# Patient Record
Sex: FEMALE | Race: WHITE | NOT HISPANIC OR LATINO | Employment: PART TIME | URBAN - METROPOLITAN AREA
[De-identification: names, ages, dates, MRNs, and addresses within clinical notes are randomized per-mention and may not be internally consistent; named-entity substitution may affect disease eponyms.]

---

## 2021-09-27 PROBLEM — M17.11 OSTEOARTHROSIS, LOCALIZED, PRIMARY, KNEE, RIGHT: Status: ACTIVE | Noted: 2021-09-27

## 2021-12-07 PROBLEM — I10 HYPERTENSION: Status: ACTIVE | Noted: 2021-12-07

## 2021-12-07 PROBLEM — R06.02 SHORTNESS OF BREATH: Status: ACTIVE | Noted: 2021-12-07

## 2021-12-07 PROBLEM — J30.2 SEASONAL ALLERGIC RHINITIS: Status: ACTIVE | Noted: 2021-12-07

## 2021-12-07 PROBLEM — Z51.89 ENCOUNTER FOR OTHER SPECIFIED AFTERCARE: Status: ACTIVE | Noted: 2018-03-21

## 2021-12-07 PROBLEM — E78.5 HYPERLIPEMIA: Status: ACTIVE | Noted: 2021-12-07

## 2021-12-28 PROBLEM — M17.11 PRIMARY OSTEOARTHRITIS OF RIGHT KNEE: Status: ACTIVE | Noted: 2021-10-25

## 2022-03-02 ENCOUNTER — TELEPHONE (OUTPATIENT)
Dept: ORTHOPEDIC SURGERY | Facility: CLINIC | Age: 52
End: 2022-03-02

## 2022-03-02 NOTE — TELEPHONE ENCOUNTER
Provider: DR. BANUELOS    Caller: PATIENT    Relationship to Patient: SELF    Phone Number: [phone]    Reason for Call: PT. HAD KNEE MANIPULATION WITH DR. BANUELOS ON 02/28/22. SHE HAS POST OP ON 03/11/22.   PT. STATES THAT [employer] DOES NOT WANT TO PAY FOR ANY MORE PHYSICAL THERAPY VISITS.   SHE STATES THAT [facility] WAS [facility]OS[facility] TO CALL DR. BANUELOS TODAY TO CHECK ABOUT GETTING AUTH FOR MORE VISITS.   PT. CALLING TO CHECK ON THAT.

## 2022-06-23 PROBLEM — Z80.0 FAMILY HISTORY OF COLON CANCER: Status: ACTIVE | Noted: 2022-06-23

## 2022-06-27 PROBLEM — G47.30 OBSERVED SLEEP APNEA: Status: ACTIVE | Noted: 2022-06-27

## 2022-06-27 PROBLEM — R06.83 SNORING: Status: ACTIVE | Noted: 2022-06-27

## 2022-06-27 PROBLEM — E66.811 CLASS 1 OBESITY: Status: ACTIVE | Noted: 2022-06-27

## 2022-07-20 PROBLEM — G47.10 HYPERSOMNIA: Status: RESOLVED | Noted: 2022-06-27 | Resolved: 2022-07-20

## 2022-07-20 PROBLEM — G47.30 OBSERVED SLEEP APNEA: Status: RESOLVED | Noted: 2022-06-27 | Resolved: 2022-07-20

## 2023-08-24 ENCOUNTER — LAB (OUTPATIENT)
Dept: LAB | Facility: HOSPITAL | Age: 53
End: 2023-08-24
Payer: COMMERCIAL

## 2023-08-24 DIAGNOSIS — R73.09 ELEVATED GLUCOSE: ICD-10-CM

## 2023-08-24 DIAGNOSIS — E78.2 MIXED HYPERLIPIDEMIA: ICD-10-CM

## 2023-08-24 DIAGNOSIS — E55.9 VITAMIN D DEFICIENCY: ICD-10-CM

## 2023-08-24 DIAGNOSIS — Z00.00 ANNUAL PHYSICAL EXAM: ICD-10-CM

## 2023-08-24 DIAGNOSIS — Z13.29 SCREENING FOR THYROID DISORDER: ICD-10-CM

## 2023-08-24 DIAGNOSIS — I10 PRIMARY HYPERTENSION: ICD-10-CM

## 2023-08-24 LAB
25(OH)D3 SERPL-MCNC: 54.8 NG/ML (ref 30–100)
ALBUMIN SERPL-MCNC: 4.3 G/DL (ref 3.5–5.2)
ALBUMIN/GLOB SERPL: 1.7 G/DL
ALP SERPL-CCNC: 125 U/L (ref 39–117)
ALT SERPL W P-5'-P-CCNC: 28 U/L (ref 1–33)
ANION GAP SERPL CALCULATED.3IONS-SCNC: 9 MMOL/L (ref 5–15)
AST SERPL-CCNC: 25 U/L (ref 1–32)
BASOPHILS # BLD AUTO: 0.04 10*3/MM3 (ref 0–0.2)
BASOPHILS NFR BLD AUTO: 1 % (ref 0–1.5)
BILIRUB SERPL-MCNC: 0.5 MG/DL (ref 0–1.2)
BUN SERPL-MCNC: 9 MG/DL (ref 6–20)
BUN/CREAT SERPL: 10.6 (ref 7–25)
CALCIUM SPEC-SCNC: 9.4 MG/DL (ref 8.6–10.5)
CHLORIDE SERPL-SCNC: 110 MMOL/L (ref 98–107)
CHOLEST SERPL-MCNC: 161 MG/DL (ref 0–200)
CO2 SERPL-SCNC: 24 MMOL/L (ref 22–29)
CREAT SERPL-MCNC: 0.85 MG/DL (ref 0.57–1)
DEPRECATED RDW RBC AUTO: 38.9 FL (ref 37–54)
EGFRCR SERPLBLD CKD-EPI 2021: 81.5 ML/MIN/1.73
EOSINOPHIL # BLD AUTO: 0.32 10*3/MM3 (ref 0–0.4)
EOSINOPHIL NFR BLD AUTO: 7.6 % (ref 0.3–6.2)
ERYTHROCYTE [DISTWIDTH] IN BLOOD BY AUTOMATED COUNT: 13 % (ref 12.3–15.4)
GLOBULIN UR ELPH-MCNC: 2.5 GM/DL
GLUCOSE SERPL-MCNC: 104 MG/DL (ref 65–99)
HCT VFR BLD AUTO: 36 % (ref 34–46.6)
HDLC SERPL-MCNC: 52 MG/DL (ref 40–60)
HGB BLD-MCNC: 12 G/DL (ref 12–15.9)
IMM GRANULOCYTES # BLD AUTO: 0.01 10*3/MM3 (ref 0–0.05)
IMM GRANULOCYTES NFR BLD AUTO: 0.2 % (ref 0–0.5)
LDLC SERPL CALC-MCNC: 93 MG/DL (ref 0–100)
LDLC/HDLC SERPL: 1.78 {RATIO}
LYMPHOCYTES # BLD AUTO: 0.96 10*3/MM3 (ref 0.7–3.1)
LYMPHOCYTES NFR BLD AUTO: 22.8 % (ref 19.6–45.3)
MCH RBC QN AUTO: 28 PG (ref 26.6–33)
MCHC RBC AUTO-ENTMCNC: 33.3 G/DL (ref 31.5–35.7)
MCV RBC AUTO: 83.9 FL (ref 79–97)
MONOCYTES # BLD AUTO: 0.27 10*3/MM3 (ref 0.1–0.9)
MONOCYTES NFR BLD AUTO: 6.4 % (ref 5–12)
NEUTROPHILS NFR BLD AUTO: 2.61 10*3/MM3 (ref 1.7–7)
NEUTROPHILS NFR BLD AUTO: 62 % (ref 42.7–76)
NRBC BLD AUTO-RTO: 0 /100 WBC (ref 0–0.2)
PLATELET # BLD AUTO: 214 10*3/MM3 (ref 140–450)
PMV BLD AUTO: 10 FL (ref 6–12)
POTASSIUM SERPL-SCNC: 4 MMOL/L (ref 3.5–5.2)
PROT SERPL-MCNC: 6.8 G/DL (ref 6–8.5)
RBC # BLD AUTO: 4.29 10*6/MM3 (ref 3.77–5.28)
SODIUM SERPL-SCNC: 143 MMOL/L (ref 136–145)
T4 FREE SERPL-MCNC: 0.96 NG/DL (ref 0.93–1.7)
TRIGL SERPL-MCNC: 83 MG/DL (ref 0–150)
TSH SERPL DL<=0.05 MIU/L-ACNC: 0.8 UIU/ML (ref 0.27–4.2)
VLDLC SERPL-MCNC: 16 MG/DL (ref 5–40)
WBC NRBC COR # BLD: 4.21 10*3/MM3 (ref 3.4–10.8)

## 2023-08-24 PROCEDURE — 84443 ASSAY THYROID STIM HORMONE: CPT

## 2023-08-24 PROCEDURE — 85025 COMPLETE CBC W/AUTO DIFF WBC: CPT

## 2023-08-24 PROCEDURE — 80061 LIPID PANEL: CPT

## 2023-08-24 PROCEDURE — 36415 COLL VENOUS BLD VENIPUNCTURE: CPT

## 2023-08-24 PROCEDURE — 82306 VITAMIN D 25 HYDROXY: CPT

## 2023-08-24 PROCEDURE — 83036 HEMOGLOBIN GLYCOSYLATED A1C: CPT

## 2023-08-24 PROCEDURE — 80053 COMPREHEN METABOLIC PANEL: CPT

## 2023-08-24 PROCEDURE — 84439 ASSAY OF FREE THYROXINE: CPT

## 2023-08-25 LAB — HBA1C MFR BLD: 5.6 % (ref 4.8–5.6)

## 2023-08-31 ENCOUNTER — OFFICE VISIT (OUTPATIENT)
Dept: FAMILY MEDICINE CLINIC | Facility: CLINIC | Age: 53
End: 2023-08-31
Payer: COMMERCIAL

## 2023-08-31 VITALS
DIASTOLIC BLOOD PRESSURE: 86 MMHG | SYSTOLIC BLOOD PRESSURE: 132 MMHG | OXYGEN SATURATION: 100 % | HEIGHT: 69 IN | WEIGHT: 222 LBS | HEART RATE: 79 BPM | BODY MASS INDEX: 32.88 KG/M2

## 2023-08-31 DIAGNOSIS — I10 PRIMARY HYPERTENSION: Primary | ICD-10-CM

## 2023-08-31 PROCEDURE — 1159F MED LIST DOCD IN RCRD: CPT

## 2023-08-31 PROCEDURE — 3079F DIAST BP 80-89 MM HG: CPT

## 2023-08-31 PROCEDURE — 3075F SYST BP GE 130 - 139MM HG: CPT

## 2023-08-31 PROCEDURE — 1160F RVW MEDS BY RX/DR IN RCRD: CPT

## 2023-08-31 PROCEDURE — 99213 OFFICE O/P EST LOW 20 MIN: CPT

## 2023-08-31 RX ORDER — LISINOPRIL 20 MG/1
20 TABLET ORAL DAILY
Qty: 30 TABLET | Refills: 1 | Status: SHIPPED | OUTPATIENT
Start: 2023-08-31

## 2023-08-31 NOTE — PROGRESS NOTES
Chief Complaint  No chief complaint on file.    SUBJECTIVE  Juanita Lamar presents to [facility]    History of Present Illness  Past Medical History:   Diagnosis Date    Acid reflux     Anesthesia     slow at waking up postop 01/2022, pt received Narcan x1    Arthritis     Arthrofibrosis of knee joint, right     Asthma     Asthma     Eczema     HTN (hypertension)     Hyperlipidemia     Hypertension     IBS (irritable bowel syndrome)     Migraine       Family History   Problem Relation Age of Onset    Asthma Mother     Asthma Father     Colon cancer Father 60    Brain cancer Father     Lung cancer Father     Breast cancer Sister     Malig Hyperthermia Neg Hx       Past Surgical History:   Procedure Laterality Date    APPENDECTOMY      COLONOSCOPY      COLONOSCOPY N/A 9/15/2022    Procedure: COLONOSCOPY;  Surgeon: Clayton Stroud MD;  Location: [facility] ENDOSCOPY;  Service: Gastroenterology;  Laterality: N/A;  NORMAL COLON    GALLBLADDER SURGERY      KNEE JOINT MANIPULATION Right 02/28/2022    Procedure: RIGHT KNEE MANIPULATION;  Surgeon: Tevin Camacho MD;  Location: [facility] MAIN OR;  Service: Orthopedics;  Laterality: Right;    TOTAL KNEE ARTHROPLASTY Right 01/11/2022    Procedure: TOTAL KNEE ARTHROPLASTY WITH NAVIGATION;  Surgeon: Tevin Camacho MD;  Location: [facility] MAIN OR;  Service: Orthopedics;  Laterality: Right;    TUBAL ABDOMINAL LIGATION          Current Outpatient Medications:     albuterol sulfate  (90 Base) MCG/ACT inhaler, Inhale 2 puffs Every 4 (Four) Hours As Needed (Cough)., Disp: 8 g, Rfl: 0    atorvastatin (LIPITOR) 40 MG tablet, Take 1 tablet by mouth every night at bedtime., Disp: 90 tablet, Rfl: 1    B Complex Vitamins (VITAMIN B COMPLEX PO), Take  by mouth., Disp: , Rfl:     cetirizine (zyrTEC) 10 MG tablet, Take 1 tablet by mouth Daily., Disp: 90 tablet, Rfl: 1    diclofenac (VOLTAREN) 50 MG EC tablet, Take 1 tablet by mouth 3 (Three) Times a  "Day., Disp: 90 tablet, Rfl: 5    FLUoxetine (PROzac) 40 MG capsule, Take 1 capsule by mouth Daily., Disp: 90 capsule, Rfl: 1    lisinopril (PRINIVIL,ZESTRIL) 10 MG tablet, Take 1 tablet by mouth Daily., Disp: 30 tablet, Rfl: 1    metoprolol succinate XL (TOPROL-XL) 25 MG 24 hr tablet, Take 2 tablets by mouth Daily. Patient is taking 50mg one tablet daily, Disp: , Rfl:     montelukast (SINGULAIR) 10 MG tablet, Take 1 tablet by mouth Every Night., Disp: 90 tablet, Rfl: 1    multivitamin with minerals tablet tablet, Take 1 tablet by mouth Daily., Disp: , Rfl:     Omeprazole 20 MG tablet delayed-release, Take 20 mg by mouth Daily., Disp: 90 each, Rfl: 1    SUMAtriptan (IMITREX) 100 MG tablet, Take 1 tablet by mouth 1 (One) Time As Needed for Migraine., Disp: 9 tablet, Rfl: 1    topiramate (TOPAMAX) 200 MG tablet, Take 1 tablet by mouth 2 (Two) Times a Day., Disp: 60 tablet, Rfl: 5    Vitamin D, Cholecalciferol, (CHOLECALCIFEROL) 10 MCG (400 UNIT) tablet, Take 1 tablet by mouth Daily., Disp: 90 tablet, Rfl: 1    OBJECTIVE  Vital Signs:   There were no vitals taken for this visit.   Estimated body mass index is 32.84 kg/mý as calculated from the following:    Height as of 8/17/23: 175.3 cm (69\").    Weight as of 8/17/23: 101 kg (222 lb 6.4 oz).     Wt Readings from Last 3 Encounters:   08/17/23 101 kg (222 lb 6.4 oz)   07/28/23 101 kg (223 lb 6.4 oz)   09/15/22 98.6 kg (217 lb 6 oz)     BP Readings from Last 3 Encounters:   08/17/23 151/96   07/28/23 144/93   09/15/22 (!) 156/103       Physical Exam     Result Review        No Images in the past 120 days found..      The above data has been reviewed by Shonda Guadalupe 08/31/2023 08:58 EDT.          Patient Care Team:  Lynn Waite APRN as PCP - General (Nurse Practitioner)            ASSESSMENT & PLAN    There are no diagnoses linked to this encounter.     Tobacco Use: Low Risk     Smoking Tobacco Use: Never    Smokeless Tobacco Use: Never    Passive Exposure: Not on file "       Follow Up     No follow-ups on file.      Patient was given instructions and counseling regarding her condition or for health maintenance advice. Please see specific information pulled into the AVS if appropriate.   I have reviewed information obtained and documented by others and I have confirmed the accuracy of this documented note.    Shonda Guadalupe

## 2023-09-28 ENCOUNTER — OFFICE VISIT (OUTPATIENT)
Dept: FAMILY MEDICINE CLINIC | Facility: CLINIC | Age: 53
End: 2023-09-28
Payer: COMMERCIAL

## 2023-09-28 VITALS
SYSTOLIC BLOOD PRESSURE: 146 MMHG | HEART RATE: 72 BPM | BODY MASS INDEX: 32.91 KG/M2 | HEIGHT: 69 IN | DIASTOLIC BLOOD PRESSURE: 94 MMHG | OXYGEN SATURATION: 100 % | WEIGHT: 222.2 LBS

## 2023-09-28 DIAGNOSIS — J45.20 MILD INTERMITTENT ASTHMA WITHOUT COMPLICATION: ICD-10-CM

## 2023-09-28 DIAGNOSIS — Z23 NEED FOR INFLUENZA VACCINATION: ICD-10-CM

## 2023-09-28 DIAGNOSIS — Z23 NEED FOR TDAP VACCINATION: ICD-10-CM

## 2023-09-28 DIAGNOSIS — T14.8XXA PUNCTURE WOUND: ICD-10-CM

## 2023-09-28 DIAGNOSIS — I10 PRIMARY HYPERTENSION: Primary | ICD-10-CM

## 2023-09-28 RX ORDER — MOMETASONE FUROATE AND FORMOTEROL FUMARATE DIHYDRATE 100; 5 UG/1; UG/1
2 AEROSOL RESPIRATORY (INHALATION)
Qty: 13 G | Refills: 11 | Status: SHIPPED | OUTPATIENT
Start: 2023-09-28

## 2023-09-28 RX ORDER — LISINOPRIL 40 MG/1
40 TABLET ORAL DAILY
Qty: 30 TABLET | Refills: 0 | Status: SHIPPED | OUTPATIENT
Start: 2023-09-28

## 2023-09-28 NOTE — PROGRESS NOTES
Chief Complaint  Hypertension, asthma, stepped on nail    SUBJECTIVE  Juanita Lamar presents to [facility]    History of Present Illness  Patient is a 53-year-old female who presents today for 1m f/u for HTN. Pt'd lisinopril was increased to 20 mg at last visit. Pt states that she has been keeping a log of her Bps and they have been about 136-140/86-93. Pt is going to send in her log through [facility].  Pressure in office is elevated today at 146/94.  Patient reports she has been experiencing headache.  She has a history of migraines.  Patient reports she still currently taking Topamax and Imitrex for migraines as needed.  Patient denies any chest pain.    Patient also has asthma.  Patient reports she has noticed an increase in her shortness of breath lately.  Patient is currently not on a daily inhaler.  Patient has an albuterol Hailer to use as needed.  Patient reports she was previously on Breo but her insurance stopped paying for it.  Patient is open to discussing other options for daily inhaler for asthma.    Patient reports that she stepped on a nail about 1 week ago.  Patient reports no puncture left foot.  Patient is due Tdap vaccine.  Patient is agreeable to have done in office today.    Patient is due flu vaccine.  Patient is agreeable have done in office today.      Patient reports that yesterday she was cleaning her vaginal skillful fell and hit her head.  Patient denies any lightheaded dizziness syncope.  Patient denies any laceration.  Advised patient that if anything develops she should go to the ER and have her head scan.    No other questions or concerns today.  Past Medical History:   Diagnosis Date    Acid reflux     Anesthesia     slow at waking up postop 01/2022, pt received Narcan x1    Arthritis     Arthrofibrosis of knee joint, right     Asthma     Asthma     Eczema     HTN (hypertension)     Hyperlipidemia     Hypertension     IBS (irritable bowel syndrome)      Migraine       Family History   Problem Relation Age of Onset    Asthma Mother     Asthma Father     Colon cancer Father 60    Brain cancer Father     Lung cancer Father     Breast cancer Sister     Malig Hyperthermia Neg Hx       Past Surgical History:   Procedure Laterality Date    APPENDECTOMY      COLONOSCOPY      COLONOSCOPY N/A 9/15/2022    Procedure: COLONOSCOPY;  Surgeon: Clayton Stroud MD;  Location: [facility] ENDOSCOPY;  Service: Gastroenterology;  Laterality: N/A;  NORMAL COLON    GALLBLADDER SURGERY      KNEE JOINT MANIPULATION Right 02/28/2022    Procedure: RIGHT KNEE MANIPULATION;  Surgeon: Tevin Camacho MD;  Location: [facility] MAIN OR;  Service: Orthopedics;  Laterality: Right;    TOTAL KNEE ARTHROPLASTY Right 01/11/2022    Procedure: TOTAL KNEE ARTHROPLASTY WITH NAVIGATION;  Surgeon: Tevin Camacho MD;  Location: [facility] MAIN OR;  Service: Orthopedics;  Laterality: Right;    TUBAL ABDOMINAL LIGATION          Current Outpatient Medications:     albuterol sulfate  (90 Base) MCG/ACT inhaler, Inhale 2 puffs Every 4 (Four) Hours As Needed (Cough)., Disp: 8 g, Rfl: 0    atorvastatin (LIPITOR) 40 MG tablet, Take 1 tablet by mouth every night at bedtime., Disp: 90 tablet, Rfl: 1    B Complex Vitamins (VITAMIN B COMPLEX PO), Take  by mouth., Disp: , Rfl:     cetirizine (zyrTEC) 10 MG tablet, Take 1 tablet by mouth Daily., Disp: 90 tablet, Rfl: 1    diclofenac (VOLTAREN) 50 MG EC tablet, Take 1 tablet by mouth 3 (Three) Times a Day., Disp: 90 tablet, Rfl: 5    FLUoxetine (PROzac) 40 MG capsule, Take 1 capsule by mouth Daily., Disp: 90 capsule, Rfl: 1    montelukast (SINGULAIR) 10 MG tablet, Take 1 tablet by mouth Every Night., Disp: 90 tablet, Rfl: 1    multivitamin with minerals tablet tablet, Take 1 tablet by mouth Daily., Disp: , Rfl:     Omeprazole 20 MG tablet delayed-release, Take 20 mg by mouth Daily., Disp: 90 each, Rfl: 1    SUMAtriptan (IMITREX) 100 MG tablet, Take 1 tablet by mouth 1  "(One) Time As Needed for Migraine., Disp: 9 tablet, Rfl: 1    topiramate (TOPAMAX) 200 MG tablet, Take 1 tablet by mouth 2 (Two) Times a Day., Disp: 60 tablet, Rfl: 5    Vitamin D, Cholecalciferol, (CHOLECALCIFEROL) 10 MCG (400 UNIT) tablet, Take 1 tablet by mouth Daily., Disp: 90 tablet, Rfl: 1    lisinopril (PRINIVIL,ZESTRIL) 40 MG tablet, Take 1 tablet by mouth Daily., Disp: 30 tablet, Rfl: 0    mometasone-formoterol (Dulera) 100-5 MCG/ACT inhaler, Inhale 2 puffs 2 (Two) Times a Day., Disp: 13 g, Rfl: 11    OBJECTIVE  Vital Signs:   /94 (BP Location: Right arm, Patient Position: Sitting, Cuff Size: Large Adult)   Pulse 72   Ht 175.3 cm (69\")   Wt 101 kg (222 lb 3.2 oz)   SpO2 100%   BMI 32.81 kg/m²    Estimated body mass index is 32.81 kg/m² as calculated from the following:    Height as of this encounter: 175.3 cm (69\").    Weight as of this encounter: 101 kg (222 lb 3.2 oz).     Wt Readings from Last 3 Encounters:   09/28/23 101 kg (222 lb 3.2 oz)   08/31/23 101 kg (222 lb)   08/17/23 101 kg (222 lb 6.4 oz)     BP Readings from Last 3 Encounters:   09/28/23 146/94   08/31/23 132/86   08/17/23 151/96       Physical Exam  Vitals reviewed.   Constitutional:       General: She is not in acute distress.     Appearance: She is not ill-appearing.   HENT:      Head: Normocephalic and atraumatic.   Eyes:      Conjunctiva/sclera: Conjunctivae normal.   Cardiovascular:      Rate and Rhythm: Normal rate and regular rhythm.      Heart sounds: Normal heart sounds.   Pulmonary:      Effort: Pulmonary effort is normal.      Breath sounds: Normal breath sounds.   Musculoskeletal:      Cervical back: Normal range of motion.        Feet:    Feet:      Comments: Closed puncture wound to left foot, no erythema or warmth noted  Skin:     General: Skin is warm and dry.   Neurological:      General: No focal deficit present.      Mental Status: She is alert and oriented to person, place, and time. Mental status is at " baseline.   Psychiatric:         Mood and Affect: Mood normal.         Behavior: Behavior normal.         Thought Content: Thought content normal.         Judgment: Judgment normal.        Result Review    Common labs          8/24/2023    11:15   Common Labs   Glucose 104    BUN 9    Creatinine 0.85    Sodium 143    Potassium 4.0    Chloride 110    Calcium 9.4    Albumin 4.3    Total Bilirubin 0.5    Alkaline Phosphatase 125    AST (SGOT) 25    ALT (SGPT) 28    WBC 4.21    Hemoglobin 12.0    Hematocrit 36.0    Platelets 214    Total Cholesterol 161    Triglycerides 83    HDL Cholesterol 52    LDL Cholesterol  93    Hemoglobin A1C 5.60        No Images in the past 120 days found..      The above data has been reviewed by XOCHILT Thurman 09/28/2023 13:52 EDT.          Patient Care Team:  Lynn Waite APRN as PCP - General (Nurse Practitioner)            ASSESSMENT & PLAN    Diagnoses and all orders for this visit:    1. Primary hypertension (Primary)  Comments:  Uncontrolled, increase lisinopril to 40 mg, keep blood pressure log at home, follow-up in 1 month  Orders:  -     lisinopril (PRINIVIL,ZESTRIL) 40 MG tablet; Take 1 tablet by mouth Daily.  Dispense: 30 tablet; Refill: 0    2. Need for Tdap vaccination  Comments:  TDAP given  in office today  Orders:  -     Tdap Vaccine => 6yo IM (BOOSTRIX)    3. Need for influenza vaccination  Comments:  flu vaccine given in office today  Orders:  -     Fluzone (or Fluarix & Flulaval for VFC) >6 Mos (3060-1134)    4. Mild intermittent asthma without complication  Comments:  uncontrolled, start dulera twice daily inhaler  Orders:  -     mometasone-formoterol (Dulera) 100-5 MCG/ACT inhaler; Inhale 2 puffs 2 (Two) Times a Day.  Dispense: 13 g; Refill: 11    5. Puncture wound  Comments:  watch for any signs of infection, TDAP given         Tobacco Use: Low Risk     Smoking Tobacco Use: Never    Smokeless Tobacco Use: Never    Passive Exposure: Not on file       Follow Up      Return in about 1 month (around 10/28/2023) for hypertension, asthma.      Patient was given instructions and counseling regarding her condition or for health maintenance advice. Please see specific information pulled into the AVS if appropriate.   I have reviewed information obtained and documented by others and I have confirmed the accuracy of this documented note.    Lynn Waite, XOCHILT

## 2023-11-02 ENCOUNTER — OFFICE VISIT (OUTPATIENT)
Dept: FAMILY MEDICINE CLINIC | Facility: CLINIC | Age: 53
End: 2023-11-02
Payer: COMMERCIAL

## 2023-11-02 VITALS
BODY MASS INDEX: 33.33 KG/M2 | WEIGHT: 225 LBS | HEIGHT: 69 IN | HEART RATE: 76 BPM | SYSTOLIC BLOOD PRESSURE: 148 MMHG | OXYGEN SATURATION: 100 % | DIASTOLIC BLOOD PRESSURE: 94 MMHG

## 2023-11-02 DIAGNOSIS — I10 PRIMARY HYPERTENSION: Primary | ICD-10-CM

## 2023-11-02 PROCEDURE — 1160F RVW MEDS BY RX/DR IN RCRD: CPT

## 2023-11-02 PROCEDURE — 3077F SYST BP >= 140 MM HG: CPT

## 2023-11-02 PROCEDURE — 1159F MED LIST DOCD IN RCRD: CPT

## 2023-11-02 PROCEDURE — 3080F DIAST BP >= 90 MM HG: CPT

## 2023-11-02 PROCEDURE — 99214 OFFICE O/P EST MOD 30 MIN: CPT

## 2023-11-02 RX ORDER — LISINOPRIL 40 MG/1
40 TABLET ORAL DAILY
Qty: 30 TABLET | Refills: 0 | Status: SHIPPED | OUTPATIENT
Start: 2023-11-02

## 2023-11-02 RX ORDER — HYDROCHLOROTHIAZIDE 25 MG/1
25 TABLET ORAL DAILY
Qty: 30 TABLET | Refills: 0 | Status: SHIPPED | OUTPATIENT
Start: 2023-11-02

## 2023-11-02 NOTE — PROGRESS NOTES
Chief Complaint  HTN    SUBJECTIVE  Juanita Lamar presents to [facility]    History of Present Illness  Patient is a 53-year-old female presents today for 1 month follow-up for hypertension.  Patient is currently on lisinopril 40 mg.  Patient blood pressure in office is uncontrolled at 148/94.  Patient present home blood pressure log that averages 130s over 80s to 140s over 90s.  Patient reports doing well medication.  Denies any adverse effects.  Patient reports she has been having headaches.  Denies any chest pain, shortness of breath, vision changes.    Is due pneumococcal vaccine but declines at this time.  Understands risk not having.    No other questions or concerns today.    Past Medical History:   Diagnosis Date    Acid reflux     Anesthesia     slow at waking up postop 01/2022, pt received Narcan x1    Arthritis     Arthrofibrosis of knee joint, right     Asthma     Asthma     Eczema     HTN (hypertension)     Hyperlipidemia     Hypertension     IBS (irritable bowel syndrome)     Migraine       Family History   Problem Relation Age of Onset    Asthma Mother     Asthma Father     Colon cancer Father 60    Brain cancer Father     Lung cancer Father     Breast cancer Sister     Malig Hyperthermia Neg Hx       Past Surgical History:   Procedure Laterality Date    APPENDECTOMY      COLONOSCOPY      COLONOSCOPY N/A 9/15/2022    Procedure: COLONOSCOPY;  Surgeon: Clayton Stroud MD;  Location: [facility] ENDOSCOPY;  Service: Gastroenterology;  Laterality: N/A;  NORMAL COLON    GALLBLADDER SURGERY      KNEE JOINT MANIPULATION Right 02/28/2022    Procedure: RIGHT KNEE MANIPULATION;  Surgeon: Tevin Camacho MD;  Location: [facility] OR;  Service: Orthopedics;  Laterality: Right;    TOTAL KNEE ARTHROPLASTY Right 01/11/2022    Procedure: TOTAL KNEE ARTHROPLASTY WITH NAVIGATION;  Surgeon: Tevin Camacho MD;  Location: [facility] MAIN OR;  Service: Orthopedics;  Laterality: Right;  "   TUBAL ABDOMINAL LIGATION          Current Outpatient Medications:     albuterol sulfate  (90 Base) MCG/ACT inhaler, Inhale 2 puffs Every 4 (Four) Hours As Needed (Cough)., Disp: 8 g, Rfl: 0    atorvastatin (LIPITOR) 40 MG tablet, Take 1 tablet by mouth every night at bedtime., Disp: 90 tablet, Rfl: 1    B Complex Vitamins (VITAMIN B COMPLEX PO), Take  by mouth., Disp: , Rfl:     cetirizine (zyrTEC) 10 MG tablet, Take 1 tablet by mouth Daily., Disp: 90 tablet, Rfl: 1    diclofenac (VOLTAREN) 50 MG EC tablet, Take 1 tablet by mouth 3 (Three) Times a Day., Disp: 90 tablet, Rfl: 5    FLUoxetine (PROzac) 40 MG capsule, Take 1 capsule by mouth Daily., Disp: 90 capsule, Rfl: 1    lisinopril (PRINIVIL,ZESTRIL) 40 MG tablet, Take 1 tablet by mouth Daily., Disp: 30 tablet, Rfl: 0    mometasone-formoterol (Dulera) 100-5 MCG/ACT inhaler, Inhale 2 puffs 2 (Two) Times a Day., Disp: 13 g, Rfl: 11    montelukast (SINGULAIR) 10 MG tablet, Take 1 tablet by mouth Every Night., Disp: 90 tablet, Rfl: 1    multivitamin with minerals tablet tablet, Take 1 tablet by mouth Daily., Disp: , Rfl:     Omeprazole 20 MG tablet delayed-release, Take 20 mg by mouth Daily., Disp: 90 each, Rfl: 1    SUMAtriptan (IMITREX) 100 MG tablet, Take 1 tablet by mouth 1 (One) Time As Needed for Migraine., Disp: 9 tablet, Rfl: 1    topiramate (TOPAMAX) 200 MG tablet, Take 1 tablet by mouth 2 (Two) Times a Day., Disp: 60 tablet, Rfl: 5    Vitamin D, Cholecalciferol, (CHOLECALCIFEROL) 10 MCG (400 UNIT) tablet, Take 1 tablet by mouth Daily., Disp: 90 tablet, Rfl: 1    OBJECTIVE  Vital Signs:   /94   Pulse 76   Ht 175.3 cm (69\")   Wt 102 kg (225 lb)   SpO2 100%   BMI 33.23 kg/m²    Estimated body mass index is 33.23 kg/m² as calculated from the following:    Height as of this encounter: 175.3 cm (69\").    Weight as of this encounter: 102 kg (225 lb).     Wt Readings from Last 3 Encounters:   11/02/23 102 kg (225 lb)   09/28/23 101 kg (222 lb " 3.2 oz)   08/31/23 101 kg (222 lb)     BP Readings from Last 3 Encounters:   11/02/23 148/94   09/28/23 146/94   08/31/23 132/86       Physical Exam  Vitals reviewed.   Constitutional:       General: She is not in acute distress.     Appearance: She is not ill-appearing.   HENT:      Head: Normocephalic and atraumatic.   Eyes:      Conjunctiva/sclera: Conjunctivae normal.   Cardiovascular:      Rate and Rhythm: Normal rate and regular rhythm.      Heart sounds: Normal heart sounds.   Pulmonary:      Effort: Pulmonary effort is normal.      Breath sounds: Normal breath sounds.   Musculoskeletal:      Cervical back: Normal range of motion.   Skin:     General: Skin is warm and dry.   Neurological:      General: No focal deficit present.      Mental Status: She is alert and oriented to person, place, and time.   Psychiatric:         Mood and Affect: Mood normal.         Behavior: Behavior normal.         Thought Content: Thought content normal.         Judgment: Judgment normal.          Result Review    Common labs          8/24/2023    11:15   Common Labs   Glucose 104    BUN 9    Creatinine 0.85    Sodium 143    Potassium 4.0    Chloride 110    Calcium 9.4    Albumin 4.3    Total Bilirubin 0.5    Alkaline Phosphatase 125    AST (SGOT) 25    ALT (SGPT) 28    WBC 4.21    Hemoglobin 12.0    Hematocrit 36.0    Platelets 214    Total Cholesterol 161    Triglycerides 83    HDL Cholesterol 52    LDL Cholesterol  93    Hemoglobin A1C 5.60        No Images in the past 120 days found..      The above data has been reviewed by XOCHILT Thurman 11/02/2023 13:01 EDT.          Patient Care Team:  Lynn Waite APRN as PCP - General (Nurse Practitioner)            ASSESSMENT & PLAN    Diagnoses and all orders for this visit:    1. Primary hypertension  Comments:  Uncontrolled, add on HCTZ 25 mg, continue lisinopril to 40 mg, keep blood pressure log at home, follow-up in 1 month         Tobacco Use: Low Risk  (11/2/2023)     Patient History     Smoking Tobacco Use: Never     Smokeless Tobacco Use: Never     Passive Exposure: Not on file       Follow Up     Return in about 1 month (around 12/2/2023) for hypertension.      Patient was given instructions and counseling regarding her condition or for health maintenance advice. Please see specific information pulled into the AVS if appropriate.   I have reviewed information obtained and documented by others and I have confirmed the accuracy of this documented note.    XOCHILT Thurman

## 2023-12-01 DIAGNOSIS — I10 PRIMARY HYPERTENSION: ICD-10-CM

## 2023-12-01 RX ORDER — LISINOPRIL 40 MG/1
40 TABLET ORAL DAILY
Qty: 90 TABLET | Refills: 0 | Status: SHIPPED | OUTPATIENT
Start: 2023-12-01

## 2023-12-14 ENCOUNTER — OFFICE VISIT (OUTPATIENT)
Dept: FAMILY MEDICINE CLINIC | Facility: CLINIC | Age: 53
End: 2023-12-14
Payer: COMMERCIAL

## 2023-12-14 VITALS
WEIGHT: 223 LBS | HEART RATE: 85 BPM | HEIGHT: 69 IN | SYSTOLIC BLOOD PRESSURE: 156 MMHG | BODY MASS INDEX: 33.03 KG/M2 | DIASTOLIC BLOOD PRESSURE: 91 MMHG | OXYGEN SATURATION: 100 %

## 2023-12-14 DIAGNOSIS — R09.81 NASAL CONGESTION: ICD-10-CM

## 2023-12-14 DIAGNOSIS — H66.002 NON-RECURRENT ACUTE SUPPURATIVE OTITIS MEDIA OF LEFT EAR WITHOUT SPONTANEOUS RUPTURE OF TYMPANIC MEMBRANE: ICD-10-CM

## 2023-12-14 DIAGNOSIS — R05.1 ACUTE COUGH: ICD-10-CM

## 2023-12-14 DIAGNOSIS — I10 PRIMARY HYPERTENSION: Primary | ICD-10-CM

## 2023-12-14 DIAGNOSIS — J01.40 ACUTE NON-RECURRENT PANSINUSITIS: ICD-10-CM

## 2023-12-14 LAB
EXPIRATION DATE: NORMAL
FLUAV AG UPPER RESP QL IA.RAPID: NOT DETECTED
FLUBV AG UPPER RESP QL IA.RAPID: NOT DETECTED
INTERNAL CONTROL: NORMAL
Lab: NORMAL
SARS-COV-2 AG UPPER RESP QL IA.RAPID: NOT DETECTED

## 2023-12-14 PROCEDURE — 99214 OFFICE O/P EST MOD 30 MIN: CPT

## 2023-12-14 PROCEDURE — 1160F RVW MEDS BY RX/DR IN RCRD: CPT

## 2023-12-14 PROCEDURE — 1159F MED LIST DOCD IN RCRD: CPT

## 2023-12-14 PROCEDURE — 87428 SARSCOV & INF VIR A&B AG IA: CPT

## 2023-12-14 PROCEDURE — 3080F DIAST BP >= 90 MM HG: CPT

## 2023-12-14 PROCEDURE — 3077F SYST BP >= 140 MM HG: CPT

## 2023-12-14 RX ORDER — AZITHROMYCIN 250 MG/1
TABLET, FILM COATED ORAL
Qty: 6 TABLET | Refills: 0 | Status: SHIPPED | OUTPATIENT
Start: 2023-12-14

## 2023-12-14 RX ORDER — BENZONATATE 100 MG/1
100 CAPSULE ORAL 3 TIMES DAILY PRN
Qty: 15 CAPSULE | Refills: 0 | Status: SHIPPED | OUTPATIENT
Start: 2023-12-14

## 2023-12-14 RX ORDER — HYDROCHLOROTHIAZIDE 25 MG/1
50 TABLET ORAL DAILY
Qty: 30 TABLET | Refills: 1 | Status: SHIPPED | OUTPATIENT
Start: 2023-12-14

## 2023-12-14 NOTE — PROGRESS NOTES
Chief Complaint  Hypertension, Sinusitis, Earache, and Cough    SUBJECTIVE  Juanita Lamar presents to [facility]     History of Present Illness  Patient is a 50-year-old female presents today for 1 month follow-up for hypertension.  Patient blood pressure as office is elevated at 156/91.  Patient reasonable pressure log and it is ranging from 140s over 80s to 160s over 100s.  Patient denies any chest pain, headache, blurry vision.  At last appointment patient was started on HCTZ 25 mg in addition to lisinopril 40 mg.  Denies any adverse effects medication.  Patient reports she does wake up occasionally at night to urinate.    Pt patient also c/o of runny nose, sore throat, ear pain, h/a's, dry cough, and shortness of breath for a few days. Pt has been taking generic cold/flu medications with little relief.  Patient reports facial pain.  Patient has not been using nasal spray but reports she has Flonase at home.  Denies any known ill exposure.  Denies any known fever.    No other questions or concerns today.    Past Medical History:   Diagnosis Date    Acid reflux     Anesthesia     slow at waking up postop 01/2022, pt received Narcan x1    Arthritis     Arthrofibrosis of knee joint, right     Asthma     Asthma     Eczema     HTN (hypertension)     Hyperlipidemia     Hypertension     IBS (irritable bowel syndrome)     Migraine       Family History   Problem Relation Age of Onset    Asthma Mother     Asthma Father     Colon cancer Father 60    Brain cancer Father     Lung cancer Father     Breast cancer Sister     Malig Hyperthermia Neg Hx       Past Surgical History:   Procedure Laterality Date    APPENDECTOMY      COLONOSCOPY      COLONOSCOPY N/A 9/15/2022    Procedure: COLONOSCOPY;  Surgeon: Clayton Stroud MD;  Location: [facility] ENDOSCOPY;  Service: Gastroenterology;  Laterality: N/A;  NORMAL COLON    GALLBLADDER SURGERY      KNEE JOINT MANIPULATION Right 02/28/2022     Procedure: RIGHT KNEE MANIPULATION;  Surgeon: Tevin Camacho MD;  Location: [facility] MAIN OR;  Service: Orthopedics;  Laterality: Right;    TOTAL KNEE ARTHROPLASTY Right 01/11/2022    Procedure: TOTAL KNEE ARTHROPLASTY WITH NAVIGATION;  Surgeon: Tevin Camacho MD;  Location: [facility] MAIN OR;  Service: Orthopedics;  Laterality: Right;    TUBAL ABDOMINAL LIGATION          Current Outpatient Medications:     albuterol sulfate  (90 Base) MCG/ACT inhaler, Inhale 2 puffs Every 4 (Four) Hours As Needed (Cough)., Disp: 8 g, Rfl: 0    atorvastatin (LIPITOR) 40 MG tablet, Take 1 tablet by mouth every night at bedtime., Disp: 90 tablet, Rfl: 1    B Complex Vitamins (VITAMIN B COMPLEX PO), Take  by mouth., Disp: , Rfl:     cetirizine (zyrTEC) 10 MG tablet, Take 1 tablet by mouth Daily., Disp: 90 tablet, Rfl: 1    diclofenac (VOLTAREN) 50 MG EC tablet, Take 1 tablet by mouth 3 (Three) Times a Day., Disp: 90 tablet, Rfl: 5    FLUoxetine (PROzac) 40 MG capsule, Take 1 capsule by mouth Daily., Disp: 90 capsule, Rfl: 1    hydroCHLOROthiazide (HYDRODIURIL) 25 MG tablet, Take 1 tablet by mouth Daily., Disp: 30 tablet, Rfl: 0    lisinopril (PRINIVIL,ZESTRIL) 40 MG tablet, TAKE 1 TABLET BY MOUTH EVERY DAY, Disp: 90 tablet, Rfl: 0    mometasone-formoterol (Dulera) 100-5 MCG/ACT inhaler, Inhale 2 puffs 2 (Two) Times a Day., Disp: 13 g, Rfl: 11    montelukast (SINGULAIR) 10 MG tablet, Take 1 tablet by mouth Every Night., Disp: 90 tablet, Rfl: 1    multivitamin with minerals tablet tablet, Take 1 tablet by mouth Daily., Disp: , Rfl:     Omeprazole 20 MG tablet delayed-release, Take 20 mg by mouth Daily., Disp: 90 each, Rfl: 1    SUMAtriptan (IMITREX) 100 MG tablet, Take 1 tablet by mouth 1 (One) Time As Needed for Migraine., Disp: 9 tablet, Rfl: 1    topiramate (TOPAMAX) 200 MG tablet, Take 1 tablet by mouth 2 (Two) Times a Day., Disp: 60 tablet, Rfl: 5    Vitamin D, Cholecalciferol, (CHOLECALCIFEROL) 10 MCG (400 UNIT) tablet, Take  "1 tablet by mouth Daily., Disp: 90 tablet, Rfl: 1    OBJECTIVE  Vital Signs:   /91   Pulse 85   Ht 175.3 cm (69\")   Wt 101 kg (223 lb)   SpO2 100%   BMI 32.93 kg/m²    Estimated body mass index is 32.93 kg/m² as calculated from the following:    Height as of this encounter: 175.3 cm (69\").    Weight as of this encounter: 101 kg (223 lb).     Wt Readings from Last 3 Encounters:   12/14/23 101 kg (223 lb)   11/02/23 102 kg (225 lb)   09/28/23 101 kg (222 lb 3.2 oz)     BP Readings from Last 3 Encounters:   12/14/23 156/91   11/02/23 148/94   09/28/23 146/94       Physical Exam  Vitals reviewed.   Constitutional:       General: She is not in acute distress.     Appearance: She is ill-appearing.   HENT:      Head: Normocephalic and atraumatic.      Right Ear: Tenderness present. A middle ear effusion is present. Tympanic membrane is injected and erythematous.      Left Ear: Tympanic membrane, ear canal and external ear normal.      Nose: Congestion present.      Right Sinus: Maxillary sinus tenderness and frontal sinus tenderness present.      Left Sinus: Maxillary sinus tenderness and frontal sinus tenderness present.      Mouth/Throat:      Mouth: Mucous membranes are moist.      Pharynx: Oropharynx is clear. No oropharyngeal exudate or posterior oropharyngeal erythema.   Eyes:      Conjunctiva/sclera: Conjunctivae normal.   Cardiovascular:      Rate and Rhythm: Normal rate and regular rhythm.      Heart sounds: Normal heart sounds.   Pulmonary:      Effort: Pulmonary effort is normal.      Breath sounds: Normal breath sounds.   Musculoskeletal:      Cervical back: Normal range of motion.   Skin:     General: Skin is warm.   Neurological:      Mental Status: She is alert and oriented to person, place, and time.   Psychiatric:         Mood and Affect: Mood normal.         Behavior: Behavior normal.         Thought Content: Thought content normal.         Judgment: Judgment normal.          Result Review  "   Common labs          8/24/2023    11:15   Common Labs   Glucose 104    BUN 9    Creatinine 0.85    Sodium 143    Potassium 4.0    Chloride 110    Calcium 9.4    Albumin 4.3    Total Bilirubin 0.5    Alkaline Phosphatase 125    AST (SGOT) 25    ALT (SGPT) 28    WBC 4.21    Hemoglobin 12.0    Hematocrit 36.0    Platelets 214    Total Cholesterol 161    Triglycerides 83    HDL Cholesterol 52    LDL Cholesterol  93    Hemoglobin A1C 5.60        No Images in the past 120 days found..     The above data has been reviewed by XOCHILT Thurman 12/14/2023 13:49 EST.          Patient Care Team:  Lynn Waite APRN as PCP - General (Nurse Practitioner)            ASSESSMENT & PLAN    Diagnoses and all orders for this visit:    1. Primary hypertension (Primary)  Comments:  uncontrolled, increase hctz to 50 mg, continue lisinpril 40 mg, check bp at home, follow up in 1 month    2. Nasal congestion  Comments:  covid/flu negative  Orders:  -     POCT SARS-CoV-2 Antigen SUPRIYA + Flu    3. Acute cough  Comments:  start tessalon perles    4. Acute non-recurrent pansinusitis  Comments:  start z-katharina and flonase, continue zyrtec    5. Non-recurrent acute suppurative otitis media of left ear without spontaneous rupture of tympanic membrane  Comments:  start z-katharina and flonase, continue zyrtec         Tobacco Use: Low Risk  (12/14/2023)    Patient History     Smoking Tobacco Use: Never     Smokeless Tobacco Use: Never     Passive Exposure: Not on file       Follow Up     Return in about 1 month (around 1/14/2024) for hypertension.        Patient was given instructions and counseling regarding her condition or for health maintenance advice. Please see specific information pulled into the AVS if appropriate.   I have reviewed information obtained and documented by others and I have confirmed the accuracy of this documented note.    XOCHILT Thurman

## 2023-12-17 DIAGNOSIS — E78.2 MIXED HYPERLIPIDEMIA: ICD-10-CM

## 2023-12-17 DIAGNOSIS — I10 PRIMARY HYPERTENSION: ICD-10-CM

## 2023-12-17 DIAGNOSIS — F41.9 ANXIETY: ICD-10-CM

## 2023-12-17 DIAGNOSIS — K21.9 GASTROESOPHAGEAL REFLUX DISEASE WITHOUT ESOPHAGITIS: ICD-10-CM

## 2023-12-18 NOTE — TELEPHONE ENCOUNTER
FLUOXETINE  LRF 08/17/2023  LOV 12/14/2023  F/U 01/11/2024    ATORVASTATIN  LRF 08/17/2023  LOV 12/14/2023  F/U 01/11/2024    LISINOPRIL 20  Medication was discontinued on 08/31/2023 due dosage change       LISINOPRIL 10  Medication was discontinued on 08/31/2023 due to dosage change    OMEPRAZOLE  LRF 08/17/2023  LOV 12/14/2023  F/U 01/11/2024

## 2023-12-19 RX ORDER — LISINOPRIL 20 MG/1
40 TABLET ORAL DAILY
Qty: 30 TABLET | Refills: 1 | Status: SHIPPED | OUTPATIENT
Start: 2023-12-19

## 2023-12-19 RX ORDER — LISINOPRIL 10 MG/1
10 TABLET ORAL DAILY
Qty: 30 TABLET | Refills: 1 | OUTPATIENT
Start: 2023-12-19

## 2023-12-19 RX ORDER — FLUOXETINE HYDROCHLORIDE 40 MG/1
40 CAPSULE ORAL DAILY
Qty: 90 CAPSULE | Refills: 1 | Status: SHIPPED | OUTPATIENT
Start: 2023-12-19

## 2023-12-19 RX ORDER — OMEPRAZOLE 20 MG/1
20 CAPSULE, DELAYED RELEASE ORAL DAILY
Qty: 90 CAPSULE | Refills: 1 | Status: SHIPPED | OUTPATIENT
Start: 2023-12-19

## 2023-12-19 RX ORDER — ATORVASTATIN CALCIUM 40 MG/1
40 TABLET, FILM COATED ORAL
Qty: 90 TABLET | Refills: 1 | Status: SHIPPED | OUTPATIENT
Start: 2023-12-19

## 2024-01-16 DIAGNOSIS — G43.109 MIGRAINE WITH AURA AND WITHOUT STATUS MIGRAINOSUS, NOT INTRACTABLE: ICD-10-CM

## 2024-01-16 RX ORDER — SUMATRIPTAN 100 MG/1
100 TABLET, FILM COATED ORAL ONCE AS NEEDED
Qty: 9 TABLET | Refills: 1 | Status: SHIPPED | OUTPATIENT
Start: 2024-01-16

## 2024-01-25 ENCOUNTER — HOSPITAL ENCOUNTER (OUTPATIENT)
Dept: GENERAL RADIOLOGY | Facility: HOSPITAL | Age: 54
Discharge: HOME | End: 2024-01-25
Payer: COMMERCIAL

## 2024-01-25 ENCOUNTER — OFFICE VISIT (OUTPATIENT)
Dept: FAMILY MEDICINE CLINIC | Facility: CLINIC | Age: 54
End: 2024-01-25
Payer: COMMERCIAL

## 2024-01-25 VITALS
WEIGHT: 220 LBS | BODY MASS INDEX: 32.58 KG/M2 | OXYGEN SATURATION: 99 % | SYSTOLIC BLOOD PRESSURE: 140 MMHG | HEIGHT: 69 IN | DIASTOLIC BLOOD PRESSURE: 85 MMHG | HEART RATE: 100 BPM

## 2024-01-25 DIAGNOSIS — R06.02 SHORTNESS OF BREATH: ICD-10-CM

## 2024-01-25 DIAGNOSIS — J22 LOWER RESPIRATORY INFECTION (E.G., BRONCHITIS, PNEUMONIA, PNEUMONITIS, PULMONITIS): ICD-10-CM

## 2024-01-25 DIAGNOSIS — R05.3 PERSISTENT COUGH: Primary | ICD-10-CM

## 2024-01-25 DIAGNOSIS — R50.9 FEVER, UNSPECIFIED FEVER CAUSE: ICD-10-CM

## 2024-01-25 DIAGNOSIS — I10 PRIMARY HYPERTENSION: ICD-10-CM

## 2024-01-25 DIAGNOSIS — R05.3 PERSISTENT COUGH: ICD-10-CM

## 2024-01-25 LAB
EXPIRATION DATE: NORMAL
FLUAV AG UPPER RESP QL IA.RAPID: NOT DETECTED
FLUBV AG UPPER RESP QL IA.RAPID: NOT DETECTED
INTERNAL CONTROL: NORMAL
Lab: NORMAL
SARS-COV-2 AG UPPER RESP QL IA.RAPID: NORMAL

## 2024-01-25 PROCEDURE — 3077F SYST BP >= 140 MM HG: CPT

## 2024-01-25 PROCEDURE — 99214 OFFICE O/P EST MOD 30 MIN: CPT

## 2024-01-25 PROCEDURE — 87428 SARSCOV & INF VIR A&B AG IA: CPT

## 2024-01-25 PROCEDURE — 1159F MED LIST DOCD IN RCRD: CPT

## 2024-01-25 PROCEDURE — 3079F DIAST BP 80-89 MM HG: CPT

## 2024-01-25 PROCEDURE — 71046 X-RAY EXAM CHEST 2 VIEWS: CPT

## 2024-01-25 PROCEDURE — 1160F RVW MEDS BY RX/DR IN RCRD: CPT

## 2024-01-25 RX ORDER — DEXTROMETHORPHAN HYDROBROMIDE AND PROMETHAZINE HYDROCHLORIDE 15; 6.25 MG/5ML; MG/5ML
5 SYRUP ORAL 4 TIMES DAILY PRN
Qty: 118 ML | Refills: 0 | Status: SHIPPED | OUTPATIENT
Start: 2024-01-25

## 2024-01-25 RX ORDER — AZITHROMYCIN 250 MG/1
TABLET, FILM COATED ORAL
Qty: 6 TABLET | Refills: 0 | Status: SHIPPED | OUTPATIENT
Start: 2024-01-25

## 2024-01-25 RX ORDER — PREDNISONE 20 MG/1
TABLET ORAL
Qty: 15 TABLET | Refills: 0 | Status: SHIPPED | OUTPATIENT
Start: 2024-01-25

## 2024-01-25 RX ORDER — BENZONATATE 100 MG/1
100 CAPSULE ORAL 3 TIMES DAILY PRN
Qty: 15 CAPSULE | Refills: 0 | Status: SHIPPED | OUTPATIENT
Start: 2024-01-25

## 2024-01-25 NOTE — PROGRESS NOTES
Chief Complaint  Hypertension and Cough    SUBJECTIVE  Juanita Lamar presents to [facility]    History of Present Illness  Patient is a 53 y.o. female who presents today with complaints of persistent cough x 3 weeks. On 1/8 she was diagnosed with COVID and has had a cough since. She has been using a nebulizer. She is having some SOA. Reports occasional production of  green mucus, but mostly dry.     She is here for a 1 month follow up for HTN. BP is 140/85 today. She has been taking her medications. She is on lisinopril 40 mg and HCTZ 25 mg. Denies any chest pain.    No other concerns today.      Past Medical History:   Diagnosis Date    Acid reflux     Anesthesia     slow at waking up postop 01/2022, pt received Narcan x1    Arthritis     Arthrofibrosis of knee joint, right     Asthma     Asthma     Eczema     HTN (hypertension)     Hyperlipidemia     Hypertension     IBS (irritable bowel syndrome)     Migraine       Family History   Problem Relation Age of Onset    Asthma Mother     Asthma Father     Colon cancer Father 60    Brain cancer Father     Lung cancer Father     Breast cancer Sister     Malig Hyperthermia Neg Hx       Past Surgical History:   Procedure Laterality Date    APPENDECTOMY      COLONOSCOPY      COLONOSCOPY N/A 9/15/2022    Procedure: COLONOSCOPY;  Surgeon: Clayton Stroud MD;  Location: [facility] ENDOSCOPY;  Service: Gastroenterology;  Laterality: N/A;  NORMAL COLON    GALLBLADDER SURGERY      KNEE JOINT MANIPULATION Right 02/28/2022    Procedure: RIGHT KNEE MANIPULATION;  Surgeon: Tevin Camacho MD;  Location: [facility] OR;  Service: Orthopedics;  Laterality: Right;    TOTAL KNEE ARTHROPLASTY Right 01/11/2022    Procedure: TOTAL KNEE ARTHROPLASTY WITH NAVIGATION;  Surgeon: Tevin Camacho MD;  Location: [facility] MAIN OR;  Service: Orthopedics;  Laterality: Right;    TUBAL ABDOMINAL LIGATION          Current Outpatient Medications:     albuterol  sulfate  (90 Base) MCG/ACT inhaler, Inhale 2 puffs Every 4 (Four) Hours As Needed (Cough)., Disp: 8 g, Rfl: 0    atorvastatin (LIPITOR) 40 MG tablet, TAKE 1 TABLET BY MOUTH EVERYDAY AT BEDTIME, Disp: 90 tablet, Rfl: 1    B Complex Vitamins (VITAMIN B COMPLEX PO), Take  by mouth., Disp: , Rfl:     benzonatate (Tessalon Perles) 100 MG capsule, Take 1 capsule by mouth 3 (Three) Times a Day As Needed for Cough., Disp: 15 capsule, Rfl: 0    cetirizine (zyrTEC) 10 MG tablet, Take 1 tablet by mouth Daily., Disp: 90 tablet, Rfl: 1    diclofenac (VOLTAREN) 50 MG EC tablet, Take 1 tablet by mouth 3 (Three) Times a Day., Disp: 90 tablet, Rfl: 5    FLUoxetine (PROzac) 40 MG capsule, TAKE 1 CAPSULE BY MOUTH EVERY DAY, Disp: 90 capsule, Rfl: 1    hydroCHLOROthiazide (HYDRODIURIL) 25 MG tablet, Take 2 tablets by mouth Daily., Disp: 30 tablet, Rfl: 1    lisinopril (PRINIVIL,ZESTRIL) 40 MG tablet, TAKE 1 TABLET BY MOUTH EVERY DAY, Disp: 90 tablet, Rfl: 0    mometasone-formoterol (Dulera) 100-5 MCG/ACT inhaler, Inhale 2 puffs 2 (Two) Times a Day., Disp: 13 g, Rfl: 11    montelukast (SINGULAIR) 10 MG tablet, Take 1 tablet by mouth Every Night., Disp: 90 tablet, Rfl: 1    multivitamin with minerals tablet tablet, Take 1 tablet by mouth Daily., Disp: , Rfl:     omeprazole (priLOSEC) 20 MG capsule, TAKE 20 MG BY MOUTH DAILY., Disp: 90 capsule, Rfl: 1    SUMAtriptan (IMITREX) 100 MG tablet, TAKE 1 TABLET BY MOUTH 1 (ONE) TIME AS NEEDED FOR MIGRAINE., Disp: 9 tablet, Rfl: 1    topiramate (TOPAMAX) 200 MG tablet, Take 1 tablet by mouth 2 (Two) Times a Day., Disp: 60 tablet, Rfl: 5    Vitamin D, Cholecalciferol, (CHOLECALCIFEROL) 10 MCG (400 UNIT) tablet, Take 1 tablet by mouth Daily., Disp: 90 tablet, Rfl: 1    azithromycin (Zithromax Z-Srinivasan) 250 MG tablet, Take 2 tablets by mouth on day 1, then 1 tablet daily on days 2-5, Disp: 6 tablet, Rfl: 0    predniSONE (DELTASONE) 20 MG tablet, Take 1 tablet with breakfast and 1 tablet with  "lunch for 5 days, then take 1 tablet with breakfast for 5 days., Disp: 15 tablet, Rfl: 0    promethazine-dextromethorphan (PROMETHAZINE-DM) 6.25-15 MG/5ML syrup, Take 5 mL by mouth 4 (Four) Times a Day As Needed for Cough., Disp: 118 mL, Rfl: 0    OBJECTIVE  Vital Signs:   /85 (BP Location: Left arm, Patient Position: Sitting, Cuff Size: Large Adult)   Pulse 100   Ht 175.3 cm (69\")   Wt 99.8 kg (220 lb)   SpO2 99%   BMI 32.49 kg/m²    Estimated body mass index is 32.49 kg/m² as calculated from the following:    Height as of this encounter: 175.3 cm (69\").    Weight as of this encounter: 99.8 kg (220 lb).     Wt Readings from Last 3 Encounters:   01/25/24 99.8 kg (220 lb)   12/14/23 101 kg (223 lb)   11/02/23 102 kg (225 lb)     BP Readings from Last 3 Encounters:   01/25/24 140/85   12/14/23 156/91   11/02/23 148/94       Physical Exam  Vitals reviewed.   Constitutional:       General: She is not in acute distress.     Appearance: She is ill-appearing.   HENT:      Head: Normocephalic and atraumatic.   Eyes:      Conjunctiva/sclera: Conjunctivae normal.   Cardiovascular:      Rate and Rhythm: Normal rate and regular rhythm.      Heart sounds: Normal heart sounds.   Pulmonary:      Effort: No respiratory distress.      Breath sounds: Wheezing present. No rhonchi.      Comments: Frequent dry cough  Skin:     General: Skin is warm and dry.   Neurological:      Mental Status: She is alert and oriented to person, place, and time.   Psychiatric:         Mood and Affect: Mood normal.         Behavior: Behavior normal.         Thought Content: Thought content normal.         Judgment: Judgment normal.          Result Review    Common labs          8/24/2023    11:15   Common Labs   Glucose 104    BUN 9    Creatinine 0.85    Sodium 143    Potassium 4.0    Chloride 110    Calcium 9.4    Albumin 4.3    Total Bilirubin 0.5    Alkaline Phosphatase 125    AST (SGOT) 25    ALT (SGPT) 28    WBC 4.21    Hemoglobin 12.0  "   Hematocrit 36.0    Platelets 214    Total Cholesterol 161    Triglycerides 83    HDL Cholesterol 52    LDL Cholesterol  93    Hemoglobin A1C 5.60        No Images in the past 120 days found..      The above data has been reviewed by XOCHILT Thurman 01/25/2024 13:23 EST.          Patient Care Team:  Lynn Waite APRN as PCP - General (Nurse Practitioner)            ASSESSMENT & PLAN    Diagnoses and all orders for this visit:    1. Persistent cough (Primary)  Comments:  refilled tessalon perles, also sent in promethazine - dm, warned it may cause drowsiness  Orders:  -     benzonatate (Tessalon Perles) 100 MG capsule; Take 1 capsule by mouth 3 (Three) Times a Day As Needed for Cough.  Dispense: 15 capsule; Refill: 0  -     XR Chest 2 View; Future  -     promethazine-dextromethorphan (PROMETHAZINE-DM) 6.25-15 MG/5ML syrup; Take 5 mL by mouth 4 (Four) Times a Day As Needed for Cough.  Dispense: 118 mL; Refill: 0    2. Fever, unspecified fever cause  Comments:  covid/flu negative  Orders:  -     POCT SARS-CoV-2 Antigen SUPRIYA + Flu    3. Shortness of breath  Comments:  chest xray ordered, start prednisone, continue nebulizerr treatments  Orders:  -     predniSONE (DELTASONE) 20 MG tablet; Take 1 tablet with breakfast and 1 tablet with lunch for 5 days, then take 1 tablet with breakfast for 5 days.  Dispense: 15 tablet; Refill: 0    4. Primary hypertension  Comments:  still elevated, ontinue medications, keep checking at home, follow up in 2 weeks    5. Lower respiratory infection (e.g., bronchitis, pneumonia, pneumonitis, pulmonitis)  -     azithromycin (Zithromax Z-Srinivasan) 250 MG tablet; Take 2 tablets by mouth on day 1, then 1 tablet daily on days 2-5  Dispense: 6 tablet; Refill: 0       Due to illness and continuous cough ans use of nebulizer I feel it would be better to continue current HTN regimen until she is feeling better and we have a more accurate measure of BP.   Tobacco Use: Low Risk  (1/25/2024)     Patient History     Smoking Tobacco Use: Never     Smokeless Tobacco Use: Never     Passive Exposure: Not on file       Follow Up     Return in about 2 weeks (around 2/8/2024) for hypertension.      Patient was given instructions and counseling regarding her condition or for health maintenance advice. Please see specific information pulled into the AVS if appropriate.   I have reviewed information obtained and documented by others and I have confirmed the accuracy of this documented note.    XOCHILT Thurman

## 2024-02-08 ENCOUNTER — OFFICE VISIT (OUTPATIENT)
Dept: FAMILY MEDICINE CLINIC | Facility: CLINIC | Age: 54
End: 2024-02-08
Payer: COMMERCIAL

## 2024-02-08 VITALS
TEMPERATURE: 97.3 F | BODY MASS INDEX: 33.41 KG/M2 | HEART RATE: 76 BPM | DIASTOLIC BLOOD PRESSURE: 73 MMHG | HEIGHT: 69 IN | WEIGHT: 225.6 LBS | OXYGEN SATURATION: 98 % | SYSTOLIC BLOOD PRESSURE: 131 MMHG

## 2024-02-08 DIAGNOSIS — E55.9 VITAMIN D DEFICIENCY: ICD-10-CM

## 2024-02-08 DIAGNOSIS — G43.109 MIGRAINE WITH AURA AND WITHOUT STATUS MIGRAINOSUS, NOT INTRACTABLE: ICD-10-CM

## 2024-02-08 DIAGNOSIS — J45.20 MILD INTERMITTENT ASTHMA WITHOUT COMPLICATION: ICD-10-CM

## 2024-02-08 DIAGNOSIS — F41.9 ANXIETY: ICD-10-CM

## 2024-02-08 DIAGNOSIS — E78.2 MIXED HYPERLIPIDEMIA: ICD-10-CM

## 2024-02-08 DIAGNOSIS — J30.2 SEASONAL ALLERGIC RHINITIS, UNSPECIFIED TRIGGER: ICD-10-CM

## 2024-02-08 DIAGNOSIS — I10 PRIMARY HYPERTENSION: Primary | ICD-10-CM

## 2024-02-08 DIAGNOSIS — M19.91 PRIMARY LOCALIZED OSTEOARTHRITIS: ICD-10-CM

## 2024-02-08 DIAGNOSIS — K21.9 GASTROESOPHAGEAL REFLUX DISEASE WITHOUT ESOPHAGITIS: ICD-10-CM

## 2024-02-08 PROCEDURE — 1159F MED LIST DOCD IN RCRD: CPT

## 2024-02-08 PROCEDURE — 3075F SYST BP GE 130 - 139MM HG: CPT

## 2024-02-08 PROCEDURE — 1160F RVW MEDS BY RX/DR IN RCRD: CPT

## 2024-02-08 PROCEDURE — 3078F DIAST BP <80 MM HG: CPT

## 2024-02-08 PROCEDURE — 99214 OFFICE O/P EST MOD 30 MIN: CPT

## 2024-02-08 RX ORDER — FLUTICASONE PROPIONATE 50 MCG
2 SPRAY, SUSPENSION (ML) NASAL DAILY
Qty: 15.8 ML | Refills: 1 | Status: SHIPPED | OUTPATIENT
Start: 2024-02-08

## 2024-02-08 RX ORDER — LEVOCETIRIZINE DIHYDROCHLORIDE 5 MG/1
5 TABLET, FILM COATED ORAL EVERY EVENING
Qty: 90 TABLET | Refills: 1 | Status: SHIPPED | OUTPATIENT
Start: 2024-02-08

## 2024-02-08 RX ORDER — ERGOCALCIFEROL (VITAMIN D2) 10 MCG
400 TABLET ORAL DAILY
Qty: 90 TABLET | Refills: 1 | Status: SHIPPED | OUTPATIENT
Start: 2024-02-08

## 2024-02-08 RX ORDER — MONTELUKAST SODIUM 10 MG/1
10 TABLET ORAL NIGHTLY
Qty: 90 TABLET | Refills: 1 | Status: SHIPPED | OUTPATIENT
Start: 2024-02-08

## 2024-02-08 NOTE — PROGRESS NOTES
Chief Complaint  Hypertension, Hyperlipidemia, Heartburn, Migraine, Vitamin D Deficiency, Asthma, Arthritis, and Allergic Rhinitis    SUBJECTIVE  Juanita Lamar presents to [facility]          History of Present Illness  Patient is 53-year-old female who presents today for 6 month follow-up on chronic conditions to include:Hypertension, Hyperlipidemia, Heartburn, Migraine, Vitamin D Deficiency, Asthma, Arthritis, and Allergic Rhinitis. She is also a 2 week f/u for BP.       Hypertension:  Patient is taking Lisinopril, HCTZ.  Patient's Blood Pressure in clinic today is 131/73.  Patient does not monitor blood pressure at home.  Patient denies chest pain, shortness of air, headache, flushing, abnormal swelling in feet/ankles. She was seen in clinic 2 weeks ago for respiratory illness and BP was noted to be elevated but she had been using nebulizer and had persistent cough that we felt may ave contributed to high reading.     HLD-patient is currently taking Lipitor 40 mg.  Patient denies any adverse effects medication to include arthralgias or myalgias.  She is to update lipid panel, orders in place today.    Allergies-patient is still having issues with chronic sinusitis.  Patient is currently taking Zyrtec and Flonase and Singulair.  She has been on Zyrtec for many years.  She is open to changing medications as needed.    Asthma-patient currently taking Dulera daily.  Patient reports this controls her symptoms well.  Rarely having to use her albuterol rescue inhaler.    GERD-patient currently on Prilosec 20 mg daily.  Patient reports that this controls her reflux well.  Denies any breakthrough.  Denies any adverse effects medication.    Migraines-patient currently taking Topamax twice daily for management of migraines.  She also has Imitrex on hand that she uses as needed for breakthrough.  Patient reports both of these help control her migraines.    Vitamin D deficiency-patient  currently taking daily supplementation.  She is due updated level to assess for effectiveness.  Denies any adverse effects medication.    Anxiety-patient currently on Prozac 40 mg.  Patient reports controls her anxiety well.  Denies any SI or self-harm thoughts.  Feels her mood is stable on this dose.    Patient is due labs. Orders placed at today's visit. Discussed with patient that these are fasting labs.     No other questions or concerns today.    Past Medical History:   Diagnosis Date    Acid reflux     Anesthesia     slow at waking up postop 01/2022, pt received Narcan x1    Arthritis     Arthrofibrosis of knee joint, right     Asthma     Asthma     Eczema     HTN (hypertension)     Hyperlipidemia     Hypertension     IBS (irritable bowel syndrome)     Migraine       Family History   Problem Relation Age of Onset    Asthma Mother     Asthma Father     Colon cancer Father 60    Brain cancer Father     Lung cancer Father     Breast cancer Sister     Malig Hyperthermia Neg Hx       Past Surgical History:   Procedure Laterality Date    APPENDECTOMY      COLONOSCOPY      COLONOSCOPY N/A 9/15/2022    Procedure: COLONOSCOPY;  Surgeon: Clayton Stroud MD;  Location: [facility] ENDOSCOPY;  Service: Gastroenterology;  Laterality: N/A;  NORMAL COLON    GALLBLADDER SURGERY      KNEE JOINT MANIPULATION Right 02/28/2022    Procedure: RIGHT KNEE MANIPULATION;  Surgeon: Tevin Camacho MD;  Location: [facility] MAIN OR;  Service: Orthopedics;  Laterality: Right;    TOTAL KNEE ARTHROPLASTY Right 01/11/2022    Procedure: TOTAL KNEE ARTHROPLASTY WITH NAVIGATION;  Surgeon: Tevin Camacho MD;  Location: [facility] MAIN OR;  Service: Orthopedics;  Laterality: Right;    TUBAL ABDOMINAL LIGATION          Current Outpatient Medications:     albuterol sulfate  (90 Base) MCG/ACT inhaler, Inhale 2 puffs Every 4 (Four) Hours As Needed (Cough)., Disp: 8 g, Rfl: 0    atorvastatin (LIPITOR) 40 MG tablet, TAKE 1 TABLET BY MOUTH EVERYDAY AT  "BEDTIME, Disp: 90 tablet, Rfl: 1    B Complex Vitamins (VITAMIN B COMPLEX PO), Take  by mouth., Disp: , Rfl:     diclofenac (VOLTAREN) 50 MG EC tablet, Take 1 tablet by mouth 3 (Three) Times a Day., Disp: 90 tablet, Rfl: 5    FLUoxetine (PROzac) 40 MG capsule, TAKE 1 CAPSULE BY MOUTH EVERY DAY, Disp: 90 capsule, Rfl: 1    hydroCHLOROthiazide (HYDRODIURIL) 25 MG tablet, Take 2 tablets by mouth Daily., Disp: 30 tablet, Rfl: 1    lisinopril (PRINIVIL,ZESTRIL) 40 MG tablet, TAKE 1 TABLET BY MOUTH EVERY DAY, Disp: 90 tablet, Rfl: 0    mometasone-formoterol (Dulera) 100-5 MCG/ACT inhaler, Inhale 2 puffs 2 (Two) Times a Day., Disp: 13 g, Rfl: 11    montelukast (SINGULAIR) 10 MG tablet, Take 1 tablet by mouth Every Night., Disp: 90 tablet, Rfl: 1    multivitamin with minerals tablet tablet, Take 1 tablet by mouth Daily., Disp: , Rfl:     omeprazole (priLOSEC) 20 MG capsule, TAKE 20 MG BY MOUTH DAILY., Disp: 90 capsule, Rfl: 1    SUMAtriptan (IMITREX) 100 MG tablet, TAKE 1 TABLET BY MOUTH 1 (ONE) TIME AS NEEDED FOR MIGRAINE., Disp: 9 tablet, Rfl: 1    topiramate (TOPAMAX) 200 MG tablet, Take 1 tablet by mouth 2 (Two) Times a Day., Disp: 60 tablet, Rfl: 5    Vitamin D, Cholecalciferol, (CHOLECALCIFEROL) 10 MCG (400 UNIT) tablet, Take 1 tablet by mouth Daily., Disp: 90 tablet, Rfl: 1    fluticasone (FLONASE) 50 MCG/ACT nasal spray, 2 sprays into the nostril(s) as directed by provider Daily., Disp: 15.8 mL, Rfl: 1    levocetirizine (XYZAL) 5 MG tablet, Take 1 tablet by mouth Every Evening., Disp: 90 tablet, Rfl: 1    OBJECTIVE  Vital Signs:   /73 (BP Location: Left arm, Patient Position: Sitting, Cuff Size: Large Adult)   Pulse 76   Temp 97.3 °F (36.3 °C) (Oral)   Ht 174 cm (68.5\")   Wt 102 kg (225 lb 9.6 oz)   SpO2 98%   BMI 33.80 kg/m²    Estimated body mass index is 33.8 kg/m² as calculated from the following:    Height as of this encounter: 174 cm (68.5\").    Weight as of this encounter: 102 kg (225 lb 9.6 oz). "     Wt Readings from Last 3 Encounters:   02/08/24 102 kg (225 lb 9.6 oz)   01/25/24 99.8 kg (220 lb)   12/14/23 101 kg (223 lb)     BP Readings from Last 3 Encounters:   02/08/24 131/73   01/25/24 140/85   12/14/23 156/91       Physical Exam  Vitals reviewed.   Constitutional:       General: She is not in acute distress.     Appearance: She is not ill-appearing.   HENT:      Head: Normocephalic and atraumatic.      Nose: Congestion present.   Eyes:      Conjunctiva/sclera: Conjunctivae normal.   Cardiovascular:      Rate and Rhythm: Normal rate and regular rhythm.      Heart sounds: Normal heart sounds.   Pulmonary:      Effort: Pulmonary effort is normal.      Breath sounds: Normal breath sounds.   Musculoskeletal:      Cervical back: Normal range of motion.   Skin:     General: Skin is warm and dry.   Neurological:      Mental Status: She is alert and oriented to person, place, and time.   Psychiatric:         Mood and Affect: Mood normal.         Behavior: Behavior normal.         Thought Content: Thought content normal.         Judgment: Judgment normal.          Result Review    Common labs          8/24/2023    11:15   Common Labs   Glucose 104    BUN 9    Creatinine 0.85    Sodium 143    Potassium 4.0    Chloride 110    Calcium 9.4    Albumin 4.3    Total Bilirubin 0.5    Alkaline Phosphatase 125    AST (SGOT) 25    ALT (SGPT) 28    WBC 4.21    Hemoglobin 12.0    Hematocrit 36.0    Platelets 214    Total Cholesterol 161    Triglycerides 83    HDL Cholesterol 52    LDL Cholesterol  93    Hemoglobin A1C 5.60        XR Chest 2 View    Result Date: 1/25/2024    1. No acute process     GERALD DURHAM MD       Electronically Signed and Approved By: GERALD DURHAM MD on 1/25/2024 at 15:24                 The above data has been reviewed by XOCHILT Thurman 02/08/2024 08:05 EST.          Patient Care Team:  Lynn Waite APRN as PCP - General (Nurse Practitioner)            ASSESSMENT & PLAN    Diagnoses  and all orders for this visit:    1. Primary hypertension (Primary)  Comments:  Stable, continue lisinopril 40 mg and HCTZ 50 mg  Orders:  -     Comprehensive Metabolic Panel; Future  -     CBC & Differential; Future    2. Mixed hyperlipidemia  Comments:  Lipitor 40 mg, updated lipid panel ordered  Orders:  -     Comprehensive Metabolic Panel; Future  -     Lipid Panel; Future    3. Gastroesophageal reflux disease without esophagitis  Comments:  Stable on Prilosec 20 mg, continue    4. Mild intermittent asthma without complication  Comments:  stable on Dulera, Singulair, albuterol as needed, continue  Orders:  -     montelukast (SINGULAIR) 10 MG tablet; Take 1 tablet by mouth Every Night.  Dispense: 90 tablet; Refill: 1    5. Seasonal allergic rhinitis, unspecified trigger  Comments:  Uncontrolled, change Zyrtec to Xyzal and refilled Flonase  Orders:  -     levocetirizine (XYZAL) 5 MG tablet; Take 1 tablet by mouth Every Evening.  Dispense: 90 tablet; Refill: 1  -     fluticasone (FLONASE) 50 MCG/ACT nasal spray; 2 sprays into the nostril(s) as directed by provider Daily.  Dispense: 15.8 mL; Refill: 1  -     montelukast (SINGULAIR) 10 MG tablet; Take 1 tablet by mouth Every Night.  Dispense: 90 tablet; Refill: 1    6. Vitamin D deficiency  Comments:  Supplementation daily, updated labs ordered  Orders:  -     Vitamin D 25 hydroxy; Future  -     Vitamin D, Cholecalciferol, (CHOLECALCIFEROL) 10 MCG (400 UNIT) tablet; Take 1 tablet by mouth Daily.  Dispense: 90 tablet; Refill: 1    7. Primary localized osteoarthritis  Comments:  stable on Voltaren 50 mg 3 times a day as needed, refill sent  Orders:  -     diclofenac (VOLTAREN) 50 MG EC tablet; Take 1 tablet by mouth 3 (Three) Times a Day.  Dispense: 90 tablet; Refill: 5    8. Anxiety  Comments:  Stable on Prozac 40 mg, continue, refill sent    9. Migraine with aura and without status migrainosus, not intractable  Comments:  Stable on Topamax twice daily, Imitrex as  needed, continue  Orders:  -     topiramate (TOPAMAX) 200 MG tablet; Take 1 tablet by mouth 2 (Two) Times a Day.  Dispense: 60 tablet; Refill: 5         Tobacco Use: Low Risk  (2/8/2024)    Patient History     Smoking Tobacco Use: Never     Smokeless Tobacco Use: Never     Passive Exposure: Not on file       Follow Up     Return in about 6 months (around 8/8/2024) for Next scheduled follow up. Sooner if BP is above 130/80 consistently.       Patient was given instructions and counseling regarding her condition or for health maintenance advice. Please see specific information pulled into the AVS if appropriate.   I have reviewed information obtained and documented by others and I have confirmed the accuracy of this documented note.    XOCHILT Thurman

## 2024-02-26 DIAGNOSIS — I10 PRIMARY HYPERTENSION: ICD-10-CM

## 2024-02-27 RX ORDER — LISINOPRIL 40 MG/1
40 TABLET ORAL DAILY
Qty: 90 TABLET | Refills: 1 | Status: SHIPPED | OUTPATIENT
Start: 2024-02-27

## 2024-03-14 DIAGNOSIS — G43.109 MIGRAINE WITH AURA AND WITHOUT STATUS MIGRAINOSUS, NOT INTRACTABLE: ICD-10-CM

## 2024-03-14 RX ORDER — SUMATRIPTAN 100 MG/1
100 TABLET, FILM COATED ORAL ONCE AS NEEDED
Qty: 9 TABLET | Refills: 1 | OUTPATIENT
Start: 2024-03-14

## 2024-03-19 ENCOUNTER — LAB (OUTPATIENT)
Dept: LAB | Facility: HOSPITAL | Age: 54
End: 2024-03-19
Payer: COMMERCIAL

## 2024-03-19 DIAGNOSIS — E78.2 MIXED HYPERLIPIDEMIA: ICD-10-CM

## 2024-03-19 DIAGNOSIS — E55.9 VITAMIN D DEFICIENCY: ICD-10-CM

## 2024-03-19 DIAGNOSIS — I10 PRIMARY HYPERTENSION: ICD-10-CM

## 2024-03-19 LAB
25(OH)D3 SERPL-MCNC: 34.8 NG/ML (ref 30–100)
ALBUMIN SERPL-MCNC: 4.4 G/DL (ref 3.5–5.2)
ALBUMIN/GLOB SERPL: 1.8 G/DL
ALP SERPL-CCNC: 119 U/L (ref 39–117)
ALT SERPL W P-5'-P-CCNC: 20 U/L (ref 1–33)
ANION GAP SERPL CALCULATED.3IONS-SCNC: 8 MMOL/L (ref 5–15)
AST SERPL-CCNC: 17 U/L (ref 1–32)
BASOPHILS # BLD AUTO: 0.04 10*3/MM3 (ref 0–0.2)
BASOPHILS NFR BLD AUTO: 1 % (ref 0–1.5)
BILIRUB SERPL-MCNC: 0.6 MG/DL (ref 0–1.2)
BUN SERPL-MCNC: 12 MG/DL (ref 6–20)
BUN/CREAT SERPL: 12.8 (ref 7–25)
CALCIUM SPEC-SCNC: 9.3 MG/DL (ref 8.6–10.5)
CHLORIDE SERPL-SCNC: 108 MMOL/L (ref 98–107)
CHOLEST SERPL-MCNC: 177 MG/DL (ref 0–200)
CO2 SERPL-SCNC: 25 MMOL/L (ref 22–29)
CREAT SERPL-MCNC: 0.94 MG/DL (ref 0.57–1)
DEPRECATED RDW RBC AUTO: 42.2 FL (ref 37–54)
EGFRCR SERPLBLD CKD-EPI 2021: 72.3 ML/MIN/1.73
EOSINOPHIL # BLD AUTO: 0.32 10*3/MM3 (ref 0–0.4)
EOSINOPHIL NFR BLD AUTO: 7.8 % (ref 0.3–6.2)
ERYTHROCYTE [DISTWIDTH] IN BLOOD BY AUTOMATED COUNT: 13.5 % (ref 12.3–15.4)
GLOBULIN UR ELPH-MCNC: 2.5 GM/DL
GLUCOSE SERPL-MCNC: 93 MG/DL (ref 65–99)
HCT VFR BLD AUTO: 36.6 % (ref 34–46.6)
HDLC SERPL-MCNC: 54 MG/DL (ref 40–60)
HGB BLD-MCNC: 11.9 G/DL (ref 12–15.9)
IMM GRANULOCYTES # BLD AUTO: 0 10*3/MM3 (ref 0–0.05)
IMM GRANULOCYTES NFR BLD AUTO: 0 % (ref 0–0.5)
LDLC SERPL CALC-MCNC: 105 MG/DL (ref 0–100)
LDLC/HDLC SERPL: 1.9 {RATIO}
LYMPHOCYTES # BLD AUTO: 1.34 10*3/MM3 (ref 0.7–3.1)
LYMPHOCYTES NFR BLD AUTO: 32.5 % (ref 19.6–45.3)
MCH RBC QN AUTO: 27.9 PG (ref 26.6–33)
MCHC RBC AUTO-ENTMCNC: 32.5 G/DL (ref 31.5–35.7)
MCV RBC AUTO: 85.7 FL (ref 79–97)
MONOCYTES # BLD AUTO: 0.27 10*3/MM3 (ref 0.1–0.9)
MONOCYTES NFR BLD AUTO: 6.6 % (ref 5–12)
NEUTROPHILS NFR BLD AUTO: 2.15 10*3/MM3 (ref 1.7–7)
NEUTROPHILS NFR BLD AUTO: 52.1 % (ref 42.7–76)
NRBC BLD AUTO-RTO: 0 /100 WBC (ref 0–0.2)
PLATELET # BLD AUTO: 192 10*3/MM3 (ref 140–450)
PMV BLD AUTO: 10.2 FL (ref 6–12)
POTASSIUM SERPL-SCNC: 4.2 MMOL/L (ref 3.5–5.2)
PROT SERPL-MCNC: 6.9 G/DL (ref 6–8.5)
RBC # BLD AUTO: 4.27 10*6/MM3 (ref 3.77–5.28)
SODIUM SERPL-SCNC: 141 MMOL/L (ref 136–145)
TRIGL SERPL-MCNC: 102 MG/DL (ref 0–150)
VLDLC SERPL-MCNC: 18 MG/DL (ref 5–40)
WBC NRBC COR # BLD AUTO: 4.12 10*3/MM3 (ref 3.4–10.8)

## 2024-03-19 PROCEDURE — 80053 COMPREHEN METABOLIC PANEL: CPT

## 2024-03-19 PROCEDURE — 82306 VITAMIN D 25 HYDROXY: CPT

## 2024-03-19 PROCEDURE — 85025 COMPLETE CBC W/AUTO DIFF WBC: CPT

## 2024-03-19 PROCEDURE — 80061 LIPID PANEL: CPT

## 2024-03-19 PROCEDURE — 36415 COLL VENOUS BLD VENIPUNCTURE: CPT

## 2024-04-15 DIAGNOSIS — J30.2 SEASONAL ALLERGIC RHINITIS, UNSPECIFIED TRIGGER: ICD-10-CM

## 2024-04-16 RX ORDER — FLUTICASONE PROPIONATE 50 MCG
2 SPRAY, SUSPENSION (ML) NASAL DAILY
Qty: 16 ML | Refills: 1 | Status: SHIPPED | OUTPATIENT
Start: 2024-04-16

## 2024-05-14 ENCOUNTER — OFFICE VISIT (OUTPATIENT)
Dept: FAMILY MEDICINE CLINIC | Facility: CLINIC | Age: 54
End: 2024-05-14
Payer: COMMERCIAL

## 2024-05-14 ENCOUNTER — HOSPITAL ENCOUNTER (OUTPATIENT)
Dept: GENERAL RADIOLOGY | Facility: HOSPITAL | Age: 54
Discharge: HOME | End: 2024-05-14
Admitting: NURSE PRACTITIONER
Payer: COMMERCIAL

## 2024-05-14 VITALS
SYSTOLIC BLOOD PRESSURE: 138 MMHG | OXYGEN SATURATION: 100 % | HEART RATE: 80 BPM | WEIGHT: 231 LBS | BODY MASS INDEX: 34.21 KG/M2 | DIASTOLIC BLOOD PRESSURE: 82 MMHG | HEIGHT: 69 IN

## 2024-05-14 DIAGNOSIS — J45.20 MILD INTERMITTENT ASTHMA WITHOUT COMPLICATION: ICD-10-CM

## 2024-05-14 DIAGNOSIS — R06.2 WHEEZING: ICD-10-CM

## 2024-05-14 DIAGNOSIS — I10 HYPERTENSION, UNSPECIFIED TYPE: ICD-10-CM

## 2024-05-14 DIAGNOSIS — R05.9 COUGH, UNSPECIFIED TYPE: ICD-10-CM

## 2024-05-14 DIAGNOSIS — R05.9 COUGH, UNSPECIFIED TYPE: Primary | ICD-10-CM

## 2024-05-14 LAB
EXPIRATION DATE: NORMAL
EXPIRATION DATE: NORMAL
FLUAV AG UPPER RESP QL IA.RAPID: NOT DETECTED
FLUBV AG UPPER RESP QL IA.RAPID: NOT DETECTED
INTERNAL CONTROL: NORMAL
INTERNAL CONTROL: NORMAL
Lab: NORMAL
Lab: NORMAL
S PYO AG THROAT QL: NEGATIVE
SARS-COV-2 AG UPPER RESP QL IA.RAPID: NOT DETECTED

## 2024-05-14 PROCEDURE — 87428 SARSCOV & INF VIR A&B AG IA: CPT | Performed by: NURSE PRACTITIONER

## 2024-05-14 PROCEDURE — 3075F SYST BP GE 130 - 139MM HG: CPT | Performed by: NURSE PRACTITIONER

## 2024-05-14 PROCEDURE — 3079F DIAST BP 80-89 MM HG: CPT | Performed by: NURSE PRACTITIONER

## 2024-05-14 PROCEDURE — 96372 THER/PROPH/DIAG INJ SC/IM: CPT | Performed by: NURSE PRACTITIONER

## 2024-05-14 PROCEDURE — 1160F RVW MEDS BY RX/DR IN RCRD: CPT | Performed by: NURSE PRACTITIONER

## 2024-05-14 PROCEDURE — 1159F MED LIST DOCD IN RCRD: CPT | Performed by: NURSE PRACTITIONER

## 2024-05-14 PROCEDURE — 71046 X-RAY EXAM CHEST 2 VIEWS: CPT

## 2024-05-14 PROCEDURE — 87880 STREP A ASSAY W/OPTIC: CPT | Performed by: NURSE PRACTITIONER

## 2024-05-14 PROCEDURE — 99214 OFFICE O/P EST MOD 30 MIN: CPT | Performed by: NURSE PRACTITIONER

## 2024-05-14 PROCEDURE — 1126F AMNT PAIN NOTED NONE PRSNT: CPT | Performed by: NURSE PRACTITIONER

## 2024-05-14 RX ORDER — TRIAMCINOLONE ACETONIDE 40 MG/ML
60 INJECTION, SUSPENSION INTRA-ARTICULAR; INTRAMUSCULAR ONCE
Status: COMPLETED | OUTPATIENT
Start: 2024-05-14 | End: 2024-05-14

## 2024-05-14 RX ORDER — AZITHROMYCIN 250 MG/1
TABLET, FILM COATED ORAL
Qty: 6 TABLET | Refills: 0 | Status: SHIPPED | OUTPATIENT
Start: 2024-05-14

## 2024-05-14 RX ORDER — DEXTROMETHORPHAN HYDROBROMIDE AND PROMETHAZINE HYDROCHLORIDE 15; 6.25 MG/5ML; MG/5ML
5 SYRUP ORAL 4 TIMES DAILY PRN
Qty: 180 ML | Refills: 0 | Status: SHIPPED | OUTPATIENT
Start: 2024-05-14

## 2024-05-14 RX ADMIN — TRIAMCINOLONE ACETONIDE 60 MG: 40 INJECTION, SUSPENSION INTRA-ARTICULAR; INTRAMUSCULAR at 13:08

## 2024-05-14 NOTE — PROGRESS NOTES
Chief Complaint  Cough, Earache, and Sore Throat    SUBJECTIVE  Juanita Lamar presents to [facility] for Cough, Earache, and Sore Throat. Pt states this has been going on for a few weeks, probably about a month, his cough has gotten somewhat worse, patient states she is staying daily at the hospital with her mother who is an ICU, states that the nurses are starting to fuss at her about the cough.  Denies any shortness of breath, no fever that she is aware of    History of Present Illness  Past Medical History:   Diagnosis Date    Acid reflux     Anesthesia     slow at waking up postop 01/2022, pt received Narcan x1    Arthritis     Arthrofibrosis of knee joint, right     Asthma     Asthma     Eczema     HTN (hypertension)     Hyperlipidemia     Hypertension     IBS (irritable bowel syndrome)     Migraine       Family History   Problem Relation Age of Onset    Asthma Mother     Asthma Father     Colon cancer Father 60    Brain cancer Father     Lung cancer Father     Breast cancer Sister     Malig Hyperthermia Neg Hx       Past Surgical History:   Procedure Laterality Date    APPENDECTOMY      COLONOSCOPY      COLONOSCOPY N/A 9/15/2022    Procedure: COLONOSCOPY;  Surgeon: Clayton Stroud MD;  Location: [facility] ENDOSCOPY;  Service: Gastroenterology;  Laterality: N/A;  NORMAL COLON    GALLBLADDER SURGERY      KNEE JOINT MANIPULATION Right 02/28/2022    Procedure: RIGHT KNEE MANIPULATION;  Surgeon: Tevin Camacho MD;  Location: [facility] MAIN OR;  Service: Orthopedics;  Laterality: Right;    TOTAL KNEE ARTHROPLASTY Right 01/11/2022    Procedure: TOTAL KNEE ARTHROPLASTY WITH NAVIGATION;  Surgeon: Tevin Camacho MD;  Location: [facility] MAIN OR;  Service: Orthopedics;  Laterality: Right;    TUBAL ABDOMINAL LIGATION          Current Outpatient Medications:     albuterol sulfate  (90 Base) MCG/ACT inhaler, Inhale 2 puffs Every 4 (Four) Hours As Needed (Cough)., Disp: 8 g, Rfl:  0    atorvastatin (LIPITOR) 40 MG tablet, TAKE 1 TABLET BY MOUTH EVERYDAY AT BEDTIME, Disp: 90 tablet, Rfl: 1    B Complex Vitamins (VITAMIN B COMPLEX PO), Take  by mouth., Disp: , Rfl:     FLUoxetine (PROzac) 40 MG capsule, TAKE 1 CAPSULE BY MOUTH EVERY DAY, Disp: 90 capsule, Rfl: 1    fluticasone (FLONASE) 50 MCG/ACT nasal spray, SPRAY 2 SPRAYS INTO THE NOSTRIL AS DIRECTED BY PROVIDER DAILY., Disp: 16 mL, Rfl: 1    hydroCHLOROthiazide (HYDRODIURIL) 25 MG tablet, Take 2 tablets by mouth Daily., Disp: 30 tablet, Rfl: 1    levocetirizine (XYZAL) 5 MG tablet, Take 1 tablet by mouth Every Evening., Disp: 90 tablet, Rfl: 1    lisinopril (PRINIVIL,ZESTRIL) 40 MG tablet, TAKE 1 TABLET BY MOUTH EVERY DAY, Disp: 90 tablet, Rfl: 1    mometasone-formoterol (Dulera) 100-5 MCG/ACT inhaler, Inhale 2 puffs 2 (Two) Times a Day., Disp: 13 g, Rfl: 11    montelukast (SINGULAIR) 10 MG tablet, Take 1 tablet by mouth Every Night., Disp: 90 tablet, Rfl: 1    omeprazole (priLOSEC) 20 MG capsule, TAKE 20 MG BY MOUTH DAILY., Disp: 90 capsule, Rfl: 1    SUMAtriptan (IMITREX) 100 MG tablet, TAKE 1 TABLET BY MOUTH 1 (ONE) TIME AS NEEDED FOR MIGRAINE., Disp: 9 tablet, Rfl: 1    topiramate (TOPAMAX) 200 MG tablet, Take 1 tablet by mouth 2 (Two) Times a Day., Disp: 60 tablet, Rfl: 5    Vitamin D, Cholecalciferol, (CHOLECALCIFEROL) 10 MCG (400 UNIT) tablet, Take 1 tablet by mouth Daily., Disp: 90 tablet, Rfl: 1    azithromycin (Zithromax Z-Srinivasan) 250 MG tablet, Take 2 tablets by mouth on day 1, then 1 tablet daily on days 2-5, Disp: 6 tablet, Rfl: 0    diclofenac (VOLTAREN) 50 MG EC tablet, Take 1 tablet by mouth 3 (Three) Times a Day. (Patient not taking: Reported on 5/14/2024), Disp: 90 tablet, Rfl: 5    multivitamin with minerals tablet tablet, Take 1 tablet by mouth Daily. (Patient not taking: Reported on 5/14/2024), Disp: , Rfl:     promethazine-dextromethorphan (PROMETHAZINE-DM) 6.25-15 MG/5ML syrup, Take 5 mL by mouth 4 (Four) Times a Day As  "Needed for Cough., Disp: 180 mL, Rfl: 0  No current facility-administered medications for this visit.    OBJECTIVE  Vital Signs:   /82   Pulse 80   Ht 174 cm (68.5\")   Wt 105 kg (231 lb)   SpO2 100%   BMI 34.61 kg/m²    Estimated body mass index is 34.61 kg/m² as calculated from the following:    Height as of this encounter: 174 cm (68.5\").    Weight as of this encounter: 105 kg (231 lb).     Wt Readings from Last 3 Encounters:   05/14/24 105 kg (231 lb)   02/08/24 102 kg (225 lb 9.6 oz)   01/25/24 99.8 kg (220 lb)     BP Readings from Last 3 Encounters:   05/14/24 138/82   02/08/24 131/73   01/25/24 140/85       Physical Exam  Vitals reviewed.   Constitutional:       Appearance: Normal appearance. She is well-developed.   HENT:      Head: Normocephalic and atraumatic.      Right Ear: Tympanic membrane, ear canal and external ear normal.      Left Ear: Tympanic membrane, ear canal and external ear normal.      Nose: Congestion present.      Mouth/Throat:      Pharynx: No oropharyngeal exudate or posterior oropharyngeal erythema.   Eyes:      Conjunctiva/sclera: Conjunctivae normal.      Pupils: Pupils are equal, round, and reactive to light.   Cardiovascular:      Rate and Rhythm: Normal rate and regular rhythm.      Heart sounds: No murmur heard.     No friction rub. No gallop.   Pulmonary:      Effort: Pulmonary effort is normal.      Breath sounds: Wheezing and rales present. No rhonchi.      Comments: Scattered wheezing throughout with faint crackles in right base  Skin:     General: Skin is warm and dry.   Neurological:      Mental Status: She is alert and oriented to person, place, and time.      Cranial Nerves: No cranial nerve deficit.   Psychiatric:         Mood and Affect: Mood and affect normal.         Behavior: Behavior normal.         Thought Content: Thought content normal.         Judgment: Judgment normal.          Result Review    Lab Results   Component Value Date    COLORU Yellow " 03/31/2022    CLARITYU Clear 03/31/2022    PHUR 7.5 03/31/2022    SPECGRAVUR 1.011 03/31/2022    GLUCOSEU Negative 03/31/2022    KETONESU Negative 03/31/2022    BILIRUBINUR Negative 03/31/2022    BLOODU Negative 03/31/2022    PROTEINUA Negative 03/31/2022    LEUKOCYTESUR Negative 03/31/2022    NITRITEU Negative 03/31/2022    UROBILINOGEN 1.0 E.U./dL 03/31/2022     SARS Antigen   Date Value Ref Range Status   05/14/2024 Not Detected Not Detected, Presumptive Negative Final     Influenza A Antigen SUPRIYA   Date Value Ref Range Status   05/14/2024 Not Detected Not Detected Final     Influenza B Antigen SUPRIYA   Date Value Ref Range Status   05/14/2024 Not Detected Not Detected Final     Internal Control   Date Value Ref Range Status   05/14/2024 Passed Passed Final   05/14/2024 Passed Passed Final     Lot Number   Date Value Ref Range Status   05/14/2024 10,535  Final   05/14/2024 3,[id number]  Final     Expiration Date   Date Value Ref Range Status   05/14/2024 10/2,025  Final   05/14/2024 2/2,025  Final       XR Chest 2 View    Result Date: 1/25/2024    1. No acute process     GERALD DURHAM MD       Electronically Signed and Approved By: GERALD DURHAM MD on 1/25/2024 at 15:24                The above data has been reviewed by XOCHILT Anderson 05/14/2024 09:44 EDT.          Patient Care Team:  Lynn Waite APRN as PCP - General (Nurse Practitioner)            ASSESSMENT & PLAN    Diagnoses and all orders for this visit:    1. Cough, unspecified type (Primary)  -     POCT rapid strep A  -     POCT SARS-CoV-2 Antigen SUPRIYA + Flu  -     azithromycin (Zithromax Z-Srinivasan) 250 MG tablet; Take 2 tablets by mouth on day 1, then 1 tablet daily on days 2-5  Dispense: 6 tablet; Refill: 0  -     promethazine-dextromethorphan (PROMETHAZINE-DM) 6.25-15 MG/5ML syrup; Take 5 mL by mouth 4 (Four) Times a Day As Needed for Cough.  Dispense: 180 mL; Refill: 0  -     XR Chest 2 View; Future    2. Wheezing  -     triamcinolone acetonide  (KENALOG-40) injection 60 mg    3. Hypertension, unspecified type  Assessment & Plan:  Blood pressure above goal, remains elevated upon manual recheck, discussed with patient to monitor blood pressure closely, steroid injection does have potential to cause increase in blood pressure, discussed take medication compliantly and avoid excessive sodium, follow-up with PCP if remains elevated      4. Mild intermittent asthma without complication  Assessment & Plan:  Patient reports history of asthma typically well-controlled, patient with scattered wheezing, concern for possible pneumonia given crackles, Kenalog injection given per patient request, discussed with patient if no improvement or any worsening symptoms seek immediate reevaluation, patient does have albuterol rescue inhaler      Discussed with patient if any new or worsening symptoms seek immediate reevaluation,    Tobacco Use: Low Risk  (5/14/2024)    Patient History     Smoking Tobacco Use: Never     Smokeless Tobacco Use: Never     Passive Exposure: Not on file       Follow Up     Return if symptoms worsen or fail to improve.        Patient was given instructions and counseling regarding her condition or for health maintenance advice. Please see specific information pulled into the AVS if appropriate.   I have reviewed information obtained and documented by others and I have confirmed the accuracy of this documented note.    XOCHILT Anderson

## 2024-05-14 NOTE — ASSESSMENT & PLAN NOTE
Blood pressure above goal, remains elevated upon manual recheck, discussed with patient to monitor blood pressure closely, steroid injection does have potential to cause increase in blood pressure, discussed take medication compliantly and avoid excessive sodium, follow-up with PCP if remains elevated

## 2024-05-14 NOTE — ASSESSMENT & PLAN NOTE
Patient reports history of asthma typically well-controlled, patient with scattered wheezing, concern for possible pneumonia given crackles, Kenalog injection given per patient request, discussed with patient if no improvement or any worsening symptoms seek immediate reevaluation, patient does have albuterol rescue inhaler

## 2024-06-25 ENCOUNTER — OFFICE VISIT (OUTPATIENT)
Dept: FAMILY MEDICINE CLINIC | Facility: CLINIC | Age: 54
End: 2024-06-25
Payer: COMMERCIAL

## 2024-06-25 VITALS
SYSTOLIC BLOOD PRESSURE: 153 MMHG | HEIGHT: 69 IN | OXYGEN SATURATION: 100 % | HEART RATE: 84 BPM | DIASTOLIC BLOOD PRESSURE: 96 MMHG | WEIGHT: 232 LBS | BODY MASS INDEX: 34.36 KG/M2

## 2024-06-25 DIAGNOSIS — F41.9 ANXIETY: ICD-10-CM

## 2024-06-25 DIAGNOSIS — J45.40 MODERATE PERSISTENT ASTHMA, UNSPECIFIED WHETHER COMPLICATED: ICD-10-CM

## 2024-06-25 DIAGNOSIS — R05.3 PERSISTENT COUGH: Primary | ICD-10-CM

## 2024-06-25 DIAGNOSIS — F32.A DEPRESSION, UNSPECIFIED DEPRESSION TYPE: ICD-10-CM

## 2024-06-25 PROCEDURE — 3080F DIAST BP >= 90 MM HG: CPT

## 2024-06-25 PROCEDURE — 1160F RVW MEDS BY RX/DR IN RCRD: CPT

## 2024-06-25 PROCEDURE — 1159F MED LIST DOCD IN RCRD: CPT

## 2024-06-25 PROCEDURE — 3077F SYST BP >= 140 MM HG: CPT

## 2024-06-25 PROCEDURE — 1126F AMNT PAIN NOTED NONE PRSNT: CPT

## 2024-06-25 PROCEDURE — 99214 OFFICE O/P EST MOD 30 MIN: CPT

## 2024-06-25 RX ORDER — METHYLPREDNISOLONE 4 MG/1
TABLET ORAL
Qty: 21 TABLET | Refills: 0 | Status: SHIPPED | OUTPATIENT
Start: 2024-06-25

## 2024-06-25 RX ORDER — FLUTICASONE FUROATE, UMECLIDINIUM BROMIDE AND VILANTEROL TRIFENATATE 100; 62.5; 25 UG/1; UG/1; UG/1
1 POWDER RESPIRATORY (INHALATION)
Qty: 28 EACH | Refills: 0 | COMMUNITY
Start: 2024-06-25

## 2024-06-25 NOTE — PROGRESS NOTES
Chief Complaint  Cough and Follow-up    SUBJECTIVE  Juanita Lamar presents to [facility]     History of Present Illness  Patient is a 54-year-old female presents today following up for persistent cough since January.  She has been on different allergy medications, steroids,antibiotics and cough medications that have not provided any relief.  Patient has had normal chest x-rays x2. Patient does have asthma.  Patient is currently taking Dulera.  Patient reports when she does have productive cough is usually white in color.  Denies any shortness of breath.    She would also like referral to behavioral health for worsening depression anxiety since the death of her mother.    Past Medical History:   Diagnosis Date    Acid reflux     Anesthesia     slow at waking up postop 01/2022, pt received Narcan x1    Arthritis     Arthrofibrosis of knee joint, right     Asthma     Asthma     Eczema     HTN (hypertension)     Hyperlipidemia     Hypertension     IBS (irritable bowel syndrome)     Migraine       Family History   Problem Relation Age of Onset    Asthma Mother     Asthma Father     Colon cancer Father 60    Brain cancer Father     Lung cancer Father     Breast cancer Sister     Malig Hyperthermia Neg Hx       Past Surgical History:   Procedure Laterality Date    APPENDECTOMY      COLONOSCOPY      COLONOSCOPY N/A 9/15/2022    Procedure: COLONOSCOPY;  Surgeon: Clayton Stroud MD;  Location: [facility] ENDOSCOPY;  Service: Gastroenterology;  Laterality: N/A;  NORMAL COLON    GALLBLADDER SURGERY      KNEE JOINT MANIPULATION Right 02/28/2022    Procedure: RIGHT KNEE MANIPULATION;  Surgeon: Tevin Camacho MD;  Location: [facility] MAIN OR;  Service: Orthopedics;  Laterality: Right;    TOTAL KNEE ARTHROPLASTY Right 01/11/2022    Procedure: TOTAL KNEE ARTHROPLASTY WITH NAVIGATION;  Surgeon: Tevin Camacho MD;  Location: [facility] MAIN OR;  Service: Orthopedics;  Laterality: Right;    TUBAL  ABDOMINAL LIGATION          Current Outpatient Medications:     albuterol sulfate  (90 Base) MCG/ACT inhaler, Inhale 2 puffs Every 4 (Four) Hours As Needed (Cough)., Disp: 8 g, Rfl: 0    atorvastatin (LIPITOR) 40 MG tablet, TAKE 1 TABLET BY MOUTH EVERYDAY AT BEDTIME, Disp: 90 tablet, Rfl: 1    B Complex Vitamins (VITAMIN B COMPLEX PO), Take  by mouth., Disp: , Rfl:     diclofenac (VOLTAREN) 50 MG EC tablet, Take 1 tablet by mouth 3 (Three) Times a Day., Disp: 90 tablet, Rfl: 5    FLUoxetine (PROzac) 40 MG capsule, TAKE 1 CAPSULE BY MOUTH EVERY DAY, Disp: 90 capsule, Rfl: 1    fluticasone (FLONASE) 50 MCG/ACT nasal spray, SPRAY 2 SPRAYS INTO THE NOSTRIL AS DIRECTED BY PROVIDER DAILY., Disp: 16 mL, Rfl: 1    hydroCHLOROthiazide (HYDRODIURIL) 25 MG tablet, Take 2 tablets by mouth Daily., Disp: 30 tablet, Rfl: 1    levocetirizine (XYZAL) 5 MG tablet, Take 1 tablet by mouth Every Evening., Disp: 90 tablet, Rfl: 1    lisinopril (PRINIVIL,ZESTRIL) 40 MG tablet, TAKE 1 TABLET BY MOUTH EVERY DAY, Disp: 90 tablet, Rfl: 1    montelukast (SINGULAIR) 10 MG tablet, Take 1 tablet by mouth Every Night., Disp: 90 tablet, Rfl: 1    omeprazole (priLOSEC) 20 MG capsule, TAKE 20 MG BY MOUTH DAILY., Disp: 90 capsule, Rfl: 1    SUMAtriptan (IMITREX) 100 MG tablet, TAKE 1 TABLET BY MOUTH 1 (ONE) TIME AS NEEDED FOR MIGRAINE., Disp: 9 tablet, Rfl: 1    topiramate (TOPAMAX) 200 MG tablet, Take 1 tablet by mouth 2 (Two) Times a Day., Disp: 60 tablet, Rfl: 5    Vitamin D, Cholecalciferol, (CHOLECALCIFEROL) 10 MCG (400 UNIT) tablet, Take 1 tablet by mouth Daily., Disp: 90 tablet, Rfl: 1    azithromycin (Zithromax Z-Srinivasan) 250 MG tablet, Take 2 tablets by mouth on day 1, then 1 tablet daily on days 2-5 (Patient not taking: Reported on 6/25/2024), Disp: 6 tablet, Rfl: 0    Fluticasone-Umeclidin-Vilant (Trelegy Ellipta) 100-62.5-25 MCG/ACT inhaler, Inhale 1 puff Daily. Lot [id number] exp 07/25, Disp: 28 each, Rfl: 0    methylPREDNISolone (MEDROL) 4  "MG dose pack, Take as directed on package instructions., Disp: 21 tablet, Rfl: 0    multivitamin with minerals tablet tablet, Take 1 tablet by mouth Daily. (Patient not taking: Reported on 6/25/2024), Disp: , Rfl:     promethazine-dextromethorphan (PROMETHAZINE-DM) 6.25-15 MG/5ML syrup, Take 5 mL by mouth 4 (Four) Times a Day As Needed for Cough. (Patient not taking: Reported on 6/25/2024), Disp: 180 mL, Rfl: 0    OBJECTIVE  Vital Signs:   /96   Pulse 84   Ht 174 cm (68.5\")   Wt 105 kg (232 lb)   SpO2 100%   BMI 34.76 kg/m²    Estimated body mass index is 34.76 kg/m² as calculated from the following:    Height as of this encounter: 174 cm (68.5\").    Weight as of this encounter: 105 kg (232 lb).     Wt Readings from Last 3 Encounters:   06/25/24 105 kg (232 lb)   05/14/24 105 kg (231 lb)   02/08/24 102 kg (225 lb 9.6 oz)     BP Readings from Last 3 Encounters:   06/25/24 153/96   05/14/24 138/82   02/08/24 131/73       Physical Exam  Vitals reviewed.   Constitutional:       General: She is not in acute distress.     Appearance: She is not ill-appearing.   Eyes:      Conjunctiva/sclera: Conjunctivae normal.   Cardiovascular:      Rate and Rhythm: Normal rate and regular rhythm.      Heart sounds: Normal heart sounds.   Pulmonary:      Effort: Pulmonary effort is normal.      Breath sounds: Normal breath sounds. No wheezing or rhonchi.   Skin:     General: Skin is warm and dry.   Neurological:      Mental Status: She is alert and oriented to person, place, and time.   Psychiatric:         Mood and Affect: Affect is tearful.         Behavior: Behavior normal.          Result Review    Common labs          8/24/2023    11:15 3/19/2024    10:41   Common Labs   Glucose 104  93    BUN 9  12    Creatinine 0.85  0.94    Sodium 143  141    Potassium 4.0  4.2    Chloride 110  108    Calcium 9.4  9.3    Albumin 4.3  4.4    Total Bilirubin 0.5  0.6    Alkaline Phosphatase 125  119    AST (SGOT) 25  17    ALT (SGPT) 28 "  20    WBC 4.21  4.12    Hemoglobin 12.0  11.9    Hematocrit 36.0  36.6    Platelets 214  192    Total Cholesterol 161  177    Triglycerides 83  102    HDL Cholesterol 52  54    LDL Cholesterol  93  105    Hemoglobin A1C 5.60         XR Chest 2 View    Result Date: 5/14/2024  Impression: 1.  An acute pulmonary process is not apparent.   Electronically Signed By-Hugo Short MD On:5/14/2024 12:25 PM         The above data has been reviewed by XOCHILT Thurman 06/25/2024 15:44 EDT.          Patient Care Team:  Lynn Waite APRN as PCP - General (Nurse Practitioner)            ASSESSMENT & PLAN    Diagnoses and all orders for this visit:    1. Persistent cough (Primary)  Comments:  Start short-term steroid, stop Dulera, start Trelegy, CT placed, PFTs placed, referral to pulmonology  Orders:  -     CT Chest With Contrast; Future  -     Ambulatory Referral to Pulmonology  -     Complete PFT - Pre & Post Bronchodilator; Future  -     Fluticasone-Umeclidin-Vilant (Trelegy Ellipta) 100-62.5-25 MCG/ACT inhaler; Inhale 1 puff Daily. Lot [id number] exp 07/25  Dispense: 28 each; Refill: 0  -     methylPREDNISolone (MEDROL) 4 MG dose pack; Take as directed on package instructions.  Dispense: 21 tablet; Refill: 0    2. Depression, unspecified depression type  -     Ambulatory Referral to Behavioral Health    3. Anxiety  -     Ambulatory Referral to Behavioral Health    4. Moderate persistent asthma, unspecified whether complicated  Comments:  Stop Dulera start Trelegy, short-term steroid given  Orders:  -     CT Chest With Contrast; Future  -     Ambulatory Referral to Pulmonology  -     Complete PFT - Pre & Post Bronchodilator; Future  -     Fluticasone-Umeclidin-Vilant (Trelegy Ellipta) 100-62.5-25 MCG/ACT inhaler; Inhale 1 puff Daily. Lot BR8B exp 07/25  Dispense: 28 each; Refill: 0  -     methylPREDNISolone (MEDROL) 4 MG dose pack; Take as directed on package instructions.  Dispense: 21 tablet; Refill: 0         Tobacco Use:  Low Risk  (6/25/2024)    Patient History     Smoking Tobacco Use: Never     Smokeless Tobacco Use: Never     Passive Exposure: Not on file       Follow Up     Return in about 1 month (around 7/25/2024) for Next scheduled follow up.        Patient was given instructions and counseling regarding her condition or for health maintenance advice. Please see specific information pulled into the AVS if appropriate.   I have reviewed information obtained and documented by others and I have confirmed the accuracy of this documented note.    XOCHILT Thurman

## 2024-06-25 NOTE — PROGRESS NOTES
"Chief Complaint  Cough    Subjective    {Problem List  Visit Diagnosis   Encounters  Notes  Medications  Labs  Result Review Imaging  Media :23}    Juanita Lamar presents to [facility]  History of Present Illness  Pt is a f/u for ongoing cough. Pt was seen on 5/14/24 by XOCHILT Anderson. Z-pack and Promethazine cough syrup were prescribed. CXR was done and was WNL.     Objective   Vital Signs:  There were no vitals taken for this visit.  Estimated body mass index is 34.61 kg/m² as calculated from the following:    Height as of 5/14/24: 174 cm (68.5\").    Weight as of 5/14/24: 105 kg (231 lb).               Physical Exam   Result Review :{Labs  Result Review  Imaging  Med Tab  Media  Procedures :23}    {The following data was reviewed by (Optional):42833}  {Ambulatory Labs (Optional):77200}  {Data reviewed (Optional):19557:::1}             Assessment and Plan {CC Problem List  Visit Diagnosis   ROS  Review (Popup)  Health Maintenance  Quality  BestPractice  Medications  SmartSets  SnapShot Encounters  Media :23}    There are no diagnoses linked to this encounter.       {Time Spent (Optional):16568}  Follow Up {Instructions Charge Capture  Follow-up Communications :23}    No follow-ups on file.  Patient was given instructions and counseling regarding her condition or for health maintenance advice. Please see specific information pulled into the AVS if appropriate.         "

## 2024-07-12 ENCOUNTER — TELEPHONE (OUTPATIENT)
Dept: FAMILY MEDICINE CLINIC | Facility: CLINIC | Age: 54
End: 2024-07-12
Payer: COMMERCIAL

## 2024-07-12 DIAGNOSIS — R05.3 PERSISTENT COUGH: ICD-10-CM

## 2024-07-12 DIAGNOSIS — E78.2 MIXED HYPERLIPIDEMIA: ICD-10-CM

## 2024-07-12 DIAGNOSIS — J45.40 MODERATE PERSISTENT ASTHMA, UNSPECIFIED WHETHER COMPLICATED: ICD-10-CM

## 2024-07-12 RX ORDER — FLUTICASONE FUROATE, UMECLIDINIUM BROMIDE AND VILANTEROL TRIFENATATE 100; 62.5; 25 UG/1; UG/1; UG/1
1 POWDER RESPIRATORY (INHALATION)
Qty: 28 EACH | Refills: 0 | Status: SHIPPED | OUTPATIENT
Start: 2024-07-12

## 2024-07-12 RX ORDER — ATORVASTATIN CALCIUM 40 MG/1
40 TABLET, FILM COATED ORAL
Qty: 90 TABLET | Refills: 0 | Status: SHIPPED | OUTPATIENT
Start: 2024-07-12

## 2024-07-12 NOTE — TELEPHONE ENCOUNTER
Patient was wondering if she could get a refill of the Trelegy inhaler she got from the office the last time she was in here or if she needed to go back to her original inhaler.

## 2024-07-26 ENCOUNTER — OFFICE VISIT (OUTPATIENT)
Dept: FAMILY MEDICINE CLINIC | Facility: CLINIC | Age: 54
End: 2024-07-26
Payer: COMMERCIAL

## 2024-07-26 DIAGNOSIS — R05.3 PERSISTENT COUGH: ICD-10-CM

## 2024-07-26 DIAGNOSIS — J45.40 MODERATE PERSISTENT ASTHMA, UNSPECIFIED WHETHER COMPLICATED: ICD-10-CM

## 2024-07-26 DIAGNOSIS — Z23 NEED FOR PNEUMOCOCCAL VACCINATION: Primary | ICD-10-CM

## 2024-07-26 PROCEDURE — 90471 IMMUNIZATION ADMIN: CPT

## 2024-07-26 PROCEDURE — 90677 PCV20 VACCINE IM: CPT

## 2024-07-26 PROCEDURE — 99213 OFFICE O/P EST LOW 20 MIN: CPT

## 2024-07-26 PROCEDURE — 1126F AMNT PAIN NOTED NONE PRSNT: CPT

## 2024-07-26 RX ORDER — FLUTICASONE FUROATE, UMECLIDINIUM BROMIDE AND VILANTEROL TRIFENATATE 100; 62.5; 25 UG/1; UG/1; UG/1
1 POWDER RESPIRATORY (INHALATION)
Qty: 28 EACH | Refills: 0 | COMMUNITY
Start: 2024-07-26

## 2024-07-26 NOTE — LETTER
[facility]  Vaccine Consent Form    Patient Name:  Juanita Lamar  Patient :  1970     Vaccine(s) Ordered    Pneumococcal Conjugate Vaccine 20-Valent (PCV20)        Screening Checklist  The following questions should be completed prior to vaccination. If you answer “yes” to any question, it does not necessarily mean you should not be vaccinated. It just means we may need to clarify or ask more questions. If a question is unclear, please ask your healthcare provider to explain it.    Yes No   Any fever or moderate to severe illness today (mild illness and/or antibiotic treatment are not contraindications)?     Do you have a history of a serious reaction to any previous vaccinations, such as anaphylaxis, encephalopathy within 7 days, Guillain-[place] syndrome within 6 weeks, seizure?     Have you received any live vaccine(s) (e.g MMR, ISHAN) or any other vaccines in the last month (to ensure duplicate doses aren't given)?     Do you have an anaphylactic allergy to latex (DTaP, DTaP-IPV, Hep A, Hep B, MenB, RV, Td, Tdap), baker’s yeast (Hep B, HPV), polysorbates (RSV, nirsevimab, PCV 20, Rotavirrus, Tdap, Shingrix), or gelatin (ISHAN, MMR)?     Do you have an anaphylactic allergy to neomycin (Rabies, ISHAN, MMR, IPV, Hep A), polymyxin B (IPV), or streptomycin (IPV)?      Any cancer, leukemia, AIDS, or other immune system disorder? (ISHAN, MMR, RV)     Do you have a parent, brother, or sister with an immune system problem (if immune competence of vaccine recipient clinically verified, can proceed)? (MMR, ISHAN)     Any recent steroid treatments for >2 weeks, chemotherapy, or radiation treatment? (ISHAN, MMR)     Have you received antibody-containing blood transfusions or IVIG in the past 11 months (recommended interval is dependent on product)? (MMR, ISHAN)     Have you taken antiviral drugs (acyclovir, famciclovir, valacyclovir for ISHAN) in the last 24 or 48 hours, respectively?      Are you pregnant or planning to become  "pregnant within 1 month? (ISHAN, MMR, HPV, IPV, MenB, Abrexvy; For Hep B- refer to Engerix-B; For RSV - Abrysvo is indicated for 32-36 weeks of pregnancy from September to January)     For infants, have you ever been told your child has had intussusception or a medical emergency involving obstruction of the intestine (Rotavirus)? If not for an infant, can skip this question.         *Ordering Physicians/APC should be consulted if \"yes\" is checked by the patient or guardian above.  I have received, read, and understand the Vaccine Information Statement (VIS) for each vaccine ordered.  I have considered my or my child's health status as well as the health status of my close contacts.  I have taken the opportunity to discuss my vaccine questions with my or my child's health care provider.   I have requested that the ordered vaccine(s) be given to me or my child.  I understand the benefits and risks of the vaccines.  I understand that I should remain in the clinic for 15 minutes after receiving the vaccine(s).  _________________________________________________________  Signature of Patient or Parent/Legal Guardian ____________________  Date     "

## 2024-07-26 NOTE — PROGRESS NOTES
Chief Complaint  Asthma and Cough    SUBJECTIVE  Juanita Lamar presents to [facility]    History of Present Illness  Patient is 54-year-old female who presents today for 1 month follow-up on asthma. Pt states that her insurance would not approve to pay for her trellegy but it was helping her work of breathing. She states that she is doing okay, but still coughing really bad at night. She has an appt with pulmonology next month. Pt would like to receive her pneumo vaccine today      Past Medical History:   Diagnosis Date    Acid reflux     Anesthesia     slow at waking up postop 01/2022, pt received Narcan x1    Arthritis     Arthrofibrosis of knee joint, right     Asthma     Asthma     Eczema     HTN (hypertension)     Hyperlipidemia     Hypertension     IBS (irritable bowel syndrome)     Migraine       Family History   Problem Relation Age of Onset    Asthma Mother     Asthma Father     Colon cancer Father 60    Brain cancer Father     Lung cancer Father     Breast cancer Sister     Malig Hyperthermia Neg Hx       Past Surgical History:   Procedure Laterality Date    APPENDECTOMY      COLONOSCOPY      COLONOSCOPY N/A 9/15/2022    Procedure: COLONOSCOPY;  Surgeon: Clayton Stroud MD;  [facility];  Service: Gastroenterology;  Laterality: N/A;  NORMAL COLON    GALLBLADDER SURGERY      KNEE JOINT MANIPULATION Right 02/28/2022    Procedure: RIGHT KNEE MANIPULATION;  Surgeon: Tevin Camacho MD;  Location: [facility];  Service: Orthopedics;  Laterality: Right;    TOTAL KNEE ARTHROPLASTY Right 01/11/2022    Procedure: TOTAL KNEE ARTHROPLASTY WITH NAVIGATION;  Surgeon: Tevin Camacho MD;  Location: [facility];  Service: Orthopedics;  Laterality: Right;    TUBAL ABDOMINAL LIGATION          Current Outpatient Medications:     Fluticasone-Umeclidin-Vilant (Trelegy Ellipta) 100-62.5-25 MCG/ACT inhaler, Inhale 1 puff Daily. Lot [id number] exp 05/25, Disp: 28 each,  "Rfl: 0    albuterol sulfate  (90 Base) MCG/ACT inhaler, Inhale 2 puffs Every 4 (Four) Hours As Needed (Cough)., Disp: 8 g, Rfl: 0    atorvastatin (LIPITOR) 40 MG tablet, TAKE 1 TABLET BY MOUTH EVERYDAY AT BEDTIME, Disp: 90 tablet, Rfl: 0    B Complex Vitamins (VITAMIN B COMPLEX PO), Take  by mouth., Disp: , Rfl:     diclofenac (VOLTAREN) 50 MG EC tablet, Take 1 tablet by mouth 3 (Three) Times a Day., Disp: 90 tablet, Rfl: 5    FLUoxetine (PROzac) 40 MG capsule, TAKE 1 CAPSULE BY MOUTH EVERY DAY, Disp: 90 capsule, Rfl: 1    fluticasone (FLONASE) 50 MCG/ACT nasal spray, SPRAY 2 SPRAYS INTO THE NOSTRIL AS DIRECTED BY PROVIDER DAILY., Disp: 16 mL, Rfl: 1    hydroCHLOROthiazide (HYDRODIURIL) 25 MG tablet, Take 2 tablets by mouth Daily., Disp: 30 tablet, Rfl: 1    levocetirizine (XYZAL) 5 MG tablet, Take 1 tablet by mouth Every Evening., Disp: 90 tablet, Rfl: 1    lisinopril (PRINIVIL,ZESTRIL) 40 MG tablet, TAKE 1 TABLET BY MOUTH EVERY DAY, Disp: 90 tablet, Rfl: 1    montelukast (SINGULAIR) 10 MG tablet, Take 1 tablet by mouth Every Night., Disp: 90 tablet, Rfl: 1    omeprazole (priLOSEC) 20 MG capsule, TAKE 20 MG BY MOUTH DAILY., Disp: 90 capsule, Rfl: 1    SUMAtriptan (IMITREX) 100 MG tablet, TAKE 1 TABLET BY MOUTH 1 (ONE) TIME AS NEEDED FOR MIGRAINE., Disp: 9 tablet, Rfl: 1    topiramate (TOPAMAX) 200 MG tablet, Take 1 tablet by mouth 2 (Two) Times a Day., Disp: 60 tablet, Rfl: 5    triamcinolone (KENALOG) 0.1 % cream, Apply 1 Application topically to the appropriate area as directed 2 (Two) Times a Day., Disp: 45 g, Rfl: 0    Vitamin D, Cholecalciferol, (CHOLECALCIFEROL) 10 MCG (400 UNIT) tablet, Take 1 tablet by mouth Daily., Disp: 90 tablet, Rfl: 1    OBJECTIVE  Vital Signs:   There were no vitals taken for this visit.   Estimated body mass index is 34.76 kg/m² as calculated from the following:    Height as of 7/24/24: 174 cm (68.5\").    Weight as of 7/24/24: 105 kg (232 lb).     Wt Readings from Last 3 " Encounters:   07/24/24 105 kg (232 lb)   06/25/24 105 kg (232 lb)   05/14/24 105 kg (231 lb)     BP Readings from Last 3 Encounters:   07/24/24 146/96   06/25/24 153/96   05/14/24 138/82       Physical Exam  Vitals reviewed.   Constitutional:       General: She is not in acute distress.     Appearance: She is not ill-appearing.   HENT:      Head: Normocephalic and atraumatic.   Eyes:      Conjunctiva/sclera: Conjunctivae normal.   Cardiovascular:      Rate and Rhythm: Normal rate and regular rhythm.      Heart sounds: Normal heart sounds.   Pulmonary:      Effort: Pulmonary effort is normal.      Breath sounds: Normal breath sounds. No wheezing or rhonchi.   Neurological:      Mental Status: She is alert and oriented to person, place, and time.   Psychiatric:         Mood and Affect: Mood normal.         Behavior: Behavior normal.         Thought Content: Thought content normal.         Judgment: Judgment normal.          Result Review    Common labs          8/24/2023    11:15 3/19/2024    10:41   Common Labs   Glucose 104  93    BUN 9  12    Creatinine 0.85  0.94    Sodium 143  141    Potassium 4.0  4.2    Chloride 110  108    Calcium 9.4  9.3    Albumin 4.3  4.4    Total Bilirubin 0.5  0.6    Alkaline Phosphatase 125  119    AST (SGOT) 25  17    ALT (SGPT) 28  20    WBC 4.21  4.12    Hemoglobin 12.0  11.9    Hematocrit 36.0  36.6    Platelets 214  192    Total Cholesterol 161  177    Triglycerides 83  102    HDL Cholesterol 52  54    LDL Cholesterol  93  105    Hemoglobin A1C 5.60         XR Chest 2 View    Result Date: 5/14/2024  Impression: 1.  An acute pulmonary process is not apparent.   Electronically Signed By-Hugo Short MD On:5/14/2024 12:25 PM          The above data has been reviewed by XOCHILT Thurman 07/26/2024 14:26 EDT.          Patient Care Team:  Lynn Waite APRN as PCP - General (Nurse Practitioner)            ASSESSMENT & PLAN    Diagnoses and all orders for this visit:    1. Need for  pneumococcal vaccination (Primary)  Comments:  given in office  Orders:  -     Pneumococcal Conjugate Vaccine 20-Valent (PCV20)    2. Persistent cough  Comments:  still present, appt with pulm next month  Orders:  -     Fluticasone-Umeclidin-Vilant (Trelegy Ellipta) 100-62.5-25 MCG/ACT inhaler; Inhale 1 puff Daily. Lot J9UG exp 05/25  Dispense: 28 each; Refill: 0    3. Moderate persistent asthma, unspecified whether complicated  Comments:  given samples of trellegy with start PA  Orders:  -     Fluticasone-Umeclidin-Vilant (Trelegy Ellipta) 100-62.5-25 MCG/ACT inhaler; Inhale 1 puff Daily. Lot J9UG exp 05/25  Dispense: 28 each; Refill: 0         Tobacco Use: Low Risk  (7/24/2024)    Patient History     Smoking Tobacco Use: Never     Smokeless Tobacco Use: Never     Passive Exposure: Not on file       Follow Up     Return in about 1 month (around 8/26/2024) for Next scheduled follow up.      Patient was given instructions and counseling regarding her condition or for health maintenance advice. Please see specific information pulled into the AVS if appropriate.   I have reviewed information obtained and documented by others and I have confirmed the accuracy of this documented note.    XOCHILT Thurman

## 2024-08-07 DIAGNOSIS — M19.91 PRIMARY LOCALIZED OSTEOARTHRITIS: ICD-10-CM

## 2024-08-08 DIAGNOSIS — F41.9 ANXIETY: ICD-10-CM

## 2024-08-08 DIAGNOSIS — K21.9 GASTROESOPHAGEAL REFLUX DISEASE WITHOUT ESOPHAGITIS: ICD-10-CM

## 2024-08-08 RX ORDER — FLUOXETINE HYDROCHLORIDE 40 MG/1
40 CAPSULE ORAL DAILY
Qty: 90 CAPSULE | Refills: 1 | Status: SHIPPED | OUTPATIENT
Start: 2024-08-08

## 2024-08-08 RX ORDER — OMEPRAZOLE 20 MG/1
20 CAPSULE, DELAYED RELEASE ORAL DAILY
Qty: 90 CAPSULE | Refills: 1 | Status: SHIPPED | OUTPATIENT
Start: 2024-08-08

## 2024-08-12 DIAGNOSIS — J30.2 SEASONAL ALLERGIC RHINITIS, UNSPECIFIED TRIGGER: ICD-10-CM

## 2024-08-13 RX ORDER — FLUTICASONE PROPIONATE 50 MCG
2 SPRAY, SUSPENSION (ML) NASAL DAILY
Qty: 16 ML | Refills: 1 | Status: SHIPPED | OUTPATIENT
Start: 2024-08-13

## 2024-08-20 ENCOUNTER — TELEPHONE (OUTPATIENT)
Dept: FAMILY MEDICINE CLINIC | Facility: CLINIC | Age: 54
End: 2024-08-20

## 2024-08-29 DIAGNOSIS — I10 PRIMARY HYPERTENSION: ICD-10-CM

## 2024-08-29 RX ORDER — LISINOPRIL 40 MG/1
40 TABLET ORAL DAILY
Qty: 90 TABLET | Refills: 1 | Status: SHIPPED | OUTPATIENT
Start: 2024-08-29

## 2024-09-05 DIAGNOSIS — G43.109 MIGRAINE WITH AURA AND WITHOUT STATUS MIGRAINOSUS, NOT INTRACTABLE: ICD-10-CM

## 2024-09-05 RX ORDER — TOPIRAMATE 200 MG/1
200 TABLET, FILM COATED ORAL 2 TIMES DAILY
Qty: 60 TABLET | Refills: 5 | Status: SHIPPED | OUTPATIENT
Start: 2024-09-05

## 2024-09-13 DIAGNOSIS — J30.2 SEASONAL ALLERGIC RHINITIS, UNSPECIFIED TRIGGER: ICD-10-CM

## 2024-09-13 RX ORDER — LEVOCETIRIZINE DIHYDROCHLORIDE 5 MG/1
5 TABLET, FILM COATED ORAL EVERY EVENING
Qty: 90 TABLET | Refills: 1 | Status: SHIPPED | OUTPATIENT
Start: 2024-09-13

## 2024-09-27 ENCOUNTER — HOSPITAL ENCOUNTER (OUTPATIENT)
Dept: CT IMAGING | Facility: HOSPITAL | Age: 54
Discharge: HOME | End: 2024-09-27
Payer: COMMERCIAL

## 2024-09-27 DIAGNOSIS — R05.3 PERSISTENT COUGH: ICD-10-CM

## 2024-09-27 DIAGNOSIS — J45.40 MODERATE PERSISTENT ASTHMA, UNSPECIFIED WHETHER COMPLICATED: ICD-10-CM

## 2024-09-27 PROCEDURE — 71260 CT THORAX DX C+: CPT

## 2024-09-27 PROCEDURE — 25510000001 IOPAMIDOL PER 1 ML

## 2024-09-27 RX ORDER — IOPAMIDOL 755 MG/ML
100 INJECTION, SOLUTION INTRAVASCULAR
Status: COMPLETED | OUTPATIENT
Start: 2024-09-27 | End: 2024-09-27

## 2024-09-27 RX ADMIN — IOPAMIDOL 100 ML: 755 INJECTION, SOLUTION INTRAVENOUS at 13:06

## 2024-10-06 DIAGNOSIS — J30.2 SEASONAL ALLERGIC RHINITIS, UNSPECIFIED TRIGGER: ICD-10-CM

## 2024-10-06 DIAGNOSIS — J45.20 MILD INTERMITTENT ASTHMA WITHOUT COMPLICATION: ICD-10-CM

## 2024-10-08 RX ORDER — MONTELUKAST SODIUM 10 MG/1
10 TABLET ORAL
Qty: 90 TABLET | Refills: 1 | Status: SHIPPED | OUTPATIENT
Start: 2024-10-08

## 2024-10-14 DIAGNOSIS — E78.2 MIXED HYPERLIPIDEMIA: ICD-10-CM

## 2024-10-15 RX ORDER — ATORVASTATIN CALCIUM 40 MG/1
40 TABLET, FILM COATED ORAL
Qty: 90 TABLET | Refills: 0 | Status: SHIPPED | OUTPATIENT
Start: 2024-10-15

## 2024-11-01 ENCOUNTER — HOSPITAL ENCOUNTER (OUTPATIENT)
Dept: RESPIRATORY THERAPY | Facility: HOSPITAL | Age: 54
Discharge: HOME | End: 2024-11-01
Payer: COMMERCIAL

## 2024-11-01 DIAGNOSIS — R05.3 PERSISTENT COUGH: ICD-10-CM

## 2024-11-01 DIAGNOSIS — J45.40 MODERATE PERSISTENT ASTHMA, UNSPECIFIED WHETHER COMPLICATED: ICD-10-CM

## 2024-11-01 PROCEDURE — 94726 PLETHYSMOGRAPHY LUNG VOLUMES: CPT

## 2024-11-01 PROCEDURE — 94060 EVALUATION OF WHEEZING: CPT

## 2024-11-01 PROCEDURE — 94729 DIFFUSING CAPACITY: CPT

## 2024-11-01 RX ORDER — ALBUTEROL SULFATE 0.83 MG/ML
2.5 SOLUTION RESPIRATORY (INHALATION) ONCE
Status: COMPLETED | OUTPATIENT
Start: 2024-11-01 | End: 2024-11-01

## 2024-11-01 RX ADMIN — ALBUTEROL SULFATE 2.5 MG: 2.5 SOLUTION RESPIRATORY (INHALATION) at 15:54

## 2024-11-22 ENCOUNTER — OFFICE VISIT (OUTPATIENT)
Dept: FAMILY MEDICINE CLINIC | Facility: CLINIC | Age: 54
End: 2024-11-22
Payer: COMMERCIAL

## 2024-11-22 VITALS
SYSTOLIC BLOOD PRESSURE: 156 MMHG | TEMPERATURE: 98.6 F | OXYGEN SATURATION: 98 % | HEART RATE: 88 BPM | BODY MASS INDEX: 34.8 KG/M2 | DIASTOLIC BLOOD PRESSURE: 96 MMHG | HEIGHT: 69 IN | WEIGHT: 235 LBS

## 2024-11-22 DIAGNOSIS — J06.9 VIRAL URI WITH COUGH: Primary | ICD-10-CM

## 2024-11-22 DIAGNOSIS — J02.9 SORE THROAT: ICD-10-CM

## 2024-11-22 DIAGNOSIS — I10 PRIMARY HYPERTENSION: ICD-10-CM

## 2024-11-22 DIAGNOSIS — Z12.31 ENCOUNTER FOR SCREENING MAMMOGRAM FOR MALIGNANT NEOPLASM OF BREAST: ICD-10-CM

## 2024-11-22 RX ORDER — BROMPHENIRAMINE MALEATE, PSEUDOEPHEDRINE HYDROCHLORIDE, AND DEXTROMETHORPHAN HYDROBROMIDE 2; 30; 10 MG/5ML; MG/5ML; MG/5ML
5 SYRUP ORAL 4 TIMES DAILY PRN
Qty: 118 ML | Refills: 0 | Status: SHIPPED | OUTPATIENT
Start: 2024-11-22

## 2024-11-22 RX ORDER — HYDROCHLOROTHIAZIDE 25 MG/1
50 TABLET ORAL DAILY
Qty: 90 TABLET | Refills: 1 | Status: SHIPPED | OUTPATIENT
Start: 2024-11-22

## 2024-11-22 RX ORDER — LISINOPRIL 40 MG/1
40 TABLET ORAL DAILY
Qty: 90 TABLET | Refills: 1 | Status: SHIPPED | OUTPATIENT
Start: 2024-11-22

## 2024-11-22 NOTE — PROGRESS NOTES
Chief Complaint  Cough, Sore Throat, and Fever    SUBJECTIVE  Juanita Lamar presents to [facility]  for an acute sick visit.     History of Present Illness  She c/o cough, nasal congestion, sore throat and ear pain for 2 days. She has taken OTC meds with little relief. Reports she has been around her grandchildren that are sick. No known fever.     She also needs refill on her lisinopril for HTN. She states she is not taking the HCTZ as prescribed. She ran out and never requested a refill. BP is noted to be elevated today. She had been controlled in the past while on both lisinopril and HCTZ.     She never scheduled her mammogram that was ordered, new orders placed today. Discussed importance of having done.     She is due annual exam and will schedule for another visit.       Past Medical History:   Diagnosis Date    Acid reflux     Anesthesia     slow at waking up postop 01/2022, pt received Narcan x1    Arthritis     Arthrofibrosis of knee joint, right     Asthma     Asthma     Eczema     HTN (hypertension)     Hyperlipidemia     Hypertension     IBS (irritable bowel syndrome)     Migraine       Family History   Problem Relation Age of Onset    Asthma Mother     Asthma Father     Colon cancer Father 60    Brain cancer Father     Lung cancer Father     Breast cancer Sister     Malig Hyperthermia Neg Hx       Past Surgical History:   Procedure Laterality Date    APPENDECTOMY      COLONOSCOPY      COLONOSCOPY N/A 9/15/2022    Procedure: COLONOSCOPY;  Surgeon: Clayton Stroud MD;  Location: [facility] ENDOSCOPY;  Service: Gastroenterology;  Laterality: N/A;  NORMAL COLON    GALLBLADDER SURGERY      KNEE JOINT MANIPULATION Right 02/28/2022    Procedure: RIGHT KNEE MANIPULATION;  Surgeon: Tevin Camacho MD;  Location: [facility] MAIN OR;  Service: Orthopedics;  Laterality: Right;    TOTAL KNEE ARTHROPLASTY Right 01/11/2022    Procedure: TOTAL KNEE ARTHROPLASTY WITH NAVIGATION;   Surgeon: Tevin Camacho MD;  Location: [facility] MAIN OR;  Service: Orthopedics;  Laterality: Right;    TUBAL ABDOMINAL LIGATION          Current Outpatient Medications:     albuterol sulfate  (90 Base) MCG/ACT inhaler, Inhale 2 puffs Every 4 (Four) Hours As Needed (Cough)., Disp: 8 g, Rfl: 0    atorvastatin (LIPITOR) 40 MG tablet, TAKE 1 TABLET BY MOUTH EVERYDAY AT BEDTIME, Disp: 90 tablet, Rfl: 0    B Complex Vitamins (VITAMIN B COMPLEX PO), Take  by mouth., Disp: , Rfl:     FLUoxetine (PROzac) 40 MG capsule, TAKE 1 CAPSULE BY MOUTH EVERY DAY, Disp: 90 capsule, Rfl: 1    fluticasone (FLONASE) 50 MCG/ACT nasal spray, SPRAY 2 SPRAYS INTO THE NOSTRIL DAILY AS DIRECTED BY PROVIDER, Disp: 16 mL, Rfl: 1    hydroCHLOROthiazide 25 MG tablet, Take 2 tablets by mouth Daily., Disp: 90 tablet, Rfl: 1    lisinopril (PRINIVIL,ZESTRIL) 40 MG tablet, Take 1 tablet by mouth Daily., Disp: 90 tablet, Rfl: 1    montelukast (SINGULAIR) 10 MG tablet, TAKE 1 TABLET BY MOUTH EVERY DAY AT NIGHT, Disp: 90 tablet, Rfl: 1    omeprazole (priLOSEC) 20 MG capsule, TAKE 20 MG BY MOUTH DAILY., Disp: 90 capsule, Rfl: 1    SUMAtriptan (IMITREX) 100 MG tablet, TAKE 1 TABLET BY MOUTH 1 (ONE) TIME AS NEEDED FOR MIGRAINE., Disp: 9 tablet, Rfl: 1    topiramate (TOPAMAX) 200 MG tablet, TAKE 1 TABLET BY MOUTH TWICE A DAY, Disp: 60 tablet, Rfl: 5    triamcinolone (KENALOG) 0.1 % cream, Apply 1 Application topically to the appropriate area as directed 2 (Two) Times a Day., Disp: 45 g, Rfl: 0    Vitamin D, Cholecalciferol, (CHOLECALCIFEROL) 10 MCG (400 UNIT) tablet, Take 1 tablet by mouth Daily., Disp: 90 tablet, Rfl: 1    brompheniramine-pseudoephedrine-DM 30-2-10 MG/5ML syrup, Take 5 mL by mouth 4 (Four) Times a Day As Needed for Congestion or Cough., Disp: 118 mL, Rfl: 0    Fluticasone-Umeclidin-Vilant (Trelegy Ellipta) 100-62.5-25 MCG/ACT inhaler, Inhale 1 puff Daily. Lot [id number] exp 05/25 (Patient not taking: Reported on 11/22/2024), Disp: 28 each,  "Rfl: 0    OBJECTIVE  Vital Signs:   /96   Pulse 88   Temp 98.6 °F (37 °C) (Oral)   Ht 174 cm (68.5\")   Wt 107 kg (235 lb)   SpO2 98%   BMI 35.21 kg/m²    Estimated body mass index is 35.21 kg/m² as calculated from the following:    Height as of this encounter: 174 cm (68.5\").    Weight as of this encounter: 107 kg (235 lb).     Wt Readings from Last 3 Encounters:   11/22/24 107 kg (235 lb)   10/08/24 104 kg (230 lb)   07/24/24 105 kg (232 lb)     BP Readings from Last 3 Encounters:   11/22/24 156/96   10/08/24 (!) 155/105   07/24/24 146/96       Physical Exam  Vitals reviewed.   Constitutional:       General: She is not in acute distress.     Appearance: She is ill-appearing.   HENT:      Head: Normocephalic and atraumatic.      Right Ear: A middle ear effusion is present.      Left Ear: A middle ear effusion is present.      Nose: Congestion present.      Mouth/Throat:      Pharynx: Posterior oropharyngeal erythema present. No pharyngeal swelling or oropharyngeal exudate.      Tonsils: 0 on the right. 0 on the left.   Eyes:      Conjunctiva/sclera: Conjunctivae normal.   Cardiovascular:      Rate and Rhythm: Normal rate and regular rhythm.      Heart sounds: Normal heart sounds.   Pulmonary:      Effort: Pulmonary effort is normal. No respiratory distress.      Breath sounds: Normal breath sounds. No wheezing or rhonchi.   Musculoskeletal:      Cervical back: Normal range of motion.   Skin:     General: Skin is warm and dry.   Neurological:      Mental Status: She is alert and oriented to person, place, and time.   Psychiatric:         Mood and Affect: Mood normal.         Behavior: Behavior normal.         Thought Content: Thought content normal.         Judgment: Judgment normal.          Result Review    Common labs          3/19/2024    10:41   Common Labs   Glucose 93    BUN 12    Creatinine 0.94    Sodium 141    Potassium 4.2    Chloride 108    Calcium 9.3    Albumin 4.4    Total Bilirubin 0.6  "   Alkaline Phosphatase 119    AST (SGOT) 17    ALT (SGPT) 20    WBC 4.12    Hemoglobin 11.9    Hematocrit 36.6    Platelets 192    Total Cholesterol 177    Triglycerides 102    HDL Cholesterol 54    LDL Cholesterol  105          The above data has been reviewed by XOCHILT Thurman 11/22/2024 11:32 EST.          Patient Care Team:  Lynn Waite APRN as PCP - General (Nurse Practitioner)           ASSESSMENT & PLAN    Diagnoses and all orders for this visit:    1. Viral URI with cough (Primary)  Comments:  start bromphed dm, continue flonase, symptomatic care discussed, discussed this appears viral and self-limiting no need for ABX at this time.  Orders:  -     brompheniramine-pseudoephedrine-DM 30-2-10 MG/5ML syrup; Take 5 mL by mouth 4 (Four) Times a Day As Needed for Congestion or Cough.  Dispense: 118 mL; Refill: 0    2. Sore throat  Comments:  covid/flu/rapid strep negative  Orders:  -     POCT rapid strep A  -     POCT SARS-CoV-2 Antigen SUPRIYA + Flu    3. Primary hypertension  Comments:  Uncontrolled, restart HCTZ 50 mg, continue lisinopril 40 mg, keep blood pressure log at home, follow-up in 6 weeks.  Orders:  -     lisinopril (PRINIVIL,ZESTRIL) 40 MG tablet; Take 1 tablet by mouth Daily.  Dispense: 90 tablet; Refill: 1  -     hydroCHLOROthiazide 25 MG tablet; Take 2 tablets by mouth Daily.  Dispense: 90 tablet; Refill: 1    4. Encounter for screening mammogram for malignant neoplasm of breast  -     Mammo Screening Digital Tomosynthesis Bilateral With CAD; Future         Tobacco Use: Low Risk  (11/22/2024)    Patient History     Smoking Tobacco Use: Never     Smokeless Tobacco Use: Never     Passive Exposure: Not on file       Follow Up     Return in about 6 weeks (around 1/3/2025) for Annual physical.      Patient was given instructions and counseling regarding her condition or for health maintenance advice. Please see specific information pulled into the AVS if appropriate.   I have reviewed information  obtained and documented by others and I have confirmed the accuracy of this documented note.    Lynn Waite, APRN

## 2025-01-09 DIAGNOSIS — E78.2 MIXED HYPERLIPIDEMIA: ICD-10-CM

## 2025-01-09 RX ORDER — ATORVASTATIN CALCIUM 40 MG/1
40 TABLET, FILM COATED ORAL
Qty: 90 TABLET | Refills: 0 | Status: SHIPPED | OUTPATIENT
Start: 2025-01-09

## 2025-01-24 ENCOUNTER — HOSPITAL ENCOUNTER (OUTPATIENT)
Dept: MAMMOGRAPHY | Facility: HOSPITAL | Age: 55
Discharge: HOME | End: 2025-01-24
Payer: COMMERCIAL

## 2025-01-24 ENCOUNTER — OFFICE VISIT (OUTPATIENT)
Dept: FAMILY MEDICINE CLINIC | Facility: CLINIC | Age: 55
End: 2025-01-24
Payer: COMMERCIAL

## 2025-01-24 VITALS
SYSTOLIC BLOOD PRESSURE: 118 MMHG | DIASTOLIC BLOOD PRESSURE: 70 MMHG | WEIGHT: 235 LBS | HEART RATE: 79 BPM | HEIGHT: 69 IN | OXYGEN SATURATION: 99 % | BODY MASS INDEX: 34.8 KG/M2

## 2025-01-24 DIAGNOSIS — Z23 NEED FOR SHINGLES VACCINE: ICD-10-CM

## 2025-01-24 DIAGNOSIS — Z13.29 SCREENING FOR THYROID DISORDER: ICD-10-CM

## 2025-01-24 DIAGNOSIS — Z82.49 FAMILY HISTORY OF CARDIOVASCULAR DISEASE: ICD-10-CM

## 2025-01-24 DIAGNOSIS — J45.20 MILD INTERMITTENT ASTHMA WITHOUT COMPLICATION: ICD-10-CM

## 2025-01-24 DIAGNOSIS — E78.2 MIXED HYPERLIPIDEMIA: ICD-10-CM

## 2025-01-24 DIAGNOSIS — Z23 NEED FOR INFLUENZA VACCINATION: ICD-10-CM

## 2025-01-24 DIAGNOSIS — Z12.31 ENCOUNTER FOR SCREENING MAMMOGRAM FOR MALIGNANT NEOPLASM OF BREAST: ICD-10-CM

## 2025-01-24 DIAGNOSIS — I10 PRIMARY HYPERTENSION: ICD-10-CM

## 2025-01-24 DIAGNOSIS — Z00.00 ANNUAL PHYSICAL EXAM: Primary | ICD-10-CM

## 2025-01-24 PROCEDURE — 90472 IMMUNIZATION ADMIN EACH ADD: CPT

## 2025-01-24 PROCEDURE — 90656 IIV3 VACC NO PRSV 0.5 ML IM: CPT

## 2025-01-24 PROCEDURE — 90750 HZV VACC RECOMBINANT IM: CPT

## 2025-01-24 PROCEDURE — 90471 IMMUNIZATION ADMIN: CPT

## 2025-01-24 PROCEDURE — 1126F AMNT PAIN NOTED NONE PRSNT: CPT

## 2025-01-24 PROCEDURE — 1159F MED LIST DOCD IN RCRD: CPT

## 2025-01-24 PROCEDURE — 77067 SCR MAMMO BI INCL CAD: CPT

## 2025-01-24 PROCEDURE — 3078F DIAST BP <80 MM HG: CPT

## 2025-01-24 PROCEDURE — 77063 BREAST TOMOSYNTHESIS BI: CPT

## 2025-01-24 PROCEDURE — 1160F RVW MEDS BY RX/DR IN RCRD: CPT

## 2025-01-24 PROCEDURE — 99396 PREV VISIT EST AGE 40-64: CPT

## 2025-01-24 PROCEDURE — 3074F SYST BP LT 130 MM HG: CPT

## 2025-01-24 NOTE — LETTER
[facility]  Vaccine Consent Form    Patient Name:  Juanita Lamar  Patient :  1970        Screening Checklist  The following questions should be completed prior to vaccination. If you answer “yes” to any question, it does not necessarily mean you should not be vaccinated. It just means we may need to clarify or ask more questions. If a question is unclear, please ask your healthcare provider to explain it.    Yes No   Any fever or moderate to severe illness today (mild illness and/or antibiotic treatment are not contraindications)?     Do you have a history of a serious reaction to any previous vaccinations, such as anaphylaxis, encephalopathy within 7 days, Guillain-[place] syndrome within 6 weeks, seizure?     Have you received any live vaccine(s) (e.g MMR, ISHAN) or any other vaccines in the last month (to ensure duplicate doses aren't given)?     Do you have an anaphylactic allergy to latex (DTaP, DTaP-IPV, Hep A, Hep B, MenB, RV, Td, Tdap), baker’s yeast (Hep B, HPV), polysorbates (RSV, nirsevimab, PCV 20, Rotavirrus, Tdap, Shingrix), or gelatin (ISHAN, MMR)?     Do you have an anaphylactic allergy to neomycin (Rabies, ISHAN, MMR, IPV, Hep A), polymyxin B (IPV), or streptomycin (IPV)?      Any cancer, leukemia, AIDS, or other immune system disorder? (ISHAN, MMR, RV)     Do you have a parent, brother, or sister with an immune system problem (if immune competence of vaccine recipient clinically verified, can proceed)? (MMR, ISHAN)     Any recent steroid treatments for >2 weeks, chemotherapy, or radiation treatment? (ISHAN, MMR)     Have you received antibody-containing blood transfusions or IVIG in the past 11 months (recommended interval is dependent on product)? (MMR, ISHAN)     Have you taken antiviral drugs (acyclovir, famciclovir, valacyclovir for ISHAN) in the last 24 or 48 hours, respectively?      Are you pregnant or planning to become pregnant within 1 month? (ISHAN, MMR, HPV, IPV, MenB, Abrexvy; For Hep B-  "refer to Engerix-B; For RSV - Abrysvo is indicated for 32-36 weeks of pregnancy from September to January)     For infants, have you ever been told your child has had intussusception or a medical emergency involving obstruction of the intestine (Rotavirus)? If not for an infant, can skip this question.         *Ordering Physicians/APC should be consulted if \"yes\" is checked by the patient or guardian above.  I have received, read, and understand the Vaccine Information Statement (VIS) for each vaccine ordered.  I have considered my or my child's health status as well as the health status of my close contacts.  I have taken the opportunity to discuss my vaccine questions with my or my child's health care provider.   I have requested that the ordered vaccine(s) be given to me or my child.  I understand the benefits and risks of the vaccines.  I understand that I should remain in the clinic for 15 minutes after receiving the vaccine(s).  _________________________________________________________  Signature of Patient or Parent/Legal Guardian ____________________  Date     "

## 2025-01-24 NOTE — PROGRESS NOTES
Chief Complaint  Annual Exam    SUBJECTIVE  Juanita Lamar presents to [facility]    History of Present Illness  Patient is 54-year-old female who presents today for annual physical exam. Chronic conditions to include:Hypertension, Hyperlipidemia, Heartburn, Migraine, Vitamin D Deficiency, Asthma, Arthritis, and Allergic Rhinitis.       Hypertension:  Patient is taking Lisinopril, HCTZ.  Patient's Blood Pressure in clinic today is 118/70 .Patient does not monitor blood pressure at home.  Patient denies chest pain, shortness of air, headache, flushing, abnormal swelling in feet/ankles     HLD-patient is currently taking Lipitor 40 mg.  Patient denies any adverse effects medication to include arthralgias or myalgias.  She is to update lipid panel, orders in place today.     Allergies-patient is still having issues with chronic sinusitis.  Patient is currently taking Flonase and Singulair.       She inquires about weight loss medication. She is not a candidate for phentermine or stimulant due to HTN. She would be a good candidate for Wegovy. Due to her insurance need dx CVD.Will order CT calcium score due to HTN, HLD, family hx of cardiac death.     Asthma-She was referred to pulmonology but cancelled her appt and never rescheduled. She has not had any issues recently. Denies any nighttime SOA. She has occasional daytime SOA. States she rarely has to use albuterol rescue inhaler recently. Patient reports this controls her symptoms well.      GERD-patient currently on Prilosec 20 mg daily.  Patient reports that this controls her reflux well.  Denies any breakthrough.  Denies any adverse effects medication.     Migraines-patient currently taking Topamax twice daily for management of migraines.  She also has Imitrex on hand that she uses as needed for breakthrough.  Patient reports both of these help control her migraines. Has to take Imitrex 1-2 times a month.      Vitamin D  deficiency-patient currently taking daily supplementation.    Denies any adverse effects medication.     Anxiety-patient currently on Prozac 40 mg. She was referred to psych per her request for worsening anxiety and depression since her mother passed but she cancelled that appt and never rescheduled.   Denies any SI or self-harm thoughts. She feels stable at this time.      Mammo- scheduled today at 6:30.     Shingles and Flu vaccine- today     PAP-8/17/2023    Patient is due labs. Orders placed at today's visit. Discussed with patient that these are fasting labs.      No other questions or concerns today.         Past Medical History:   Diagnosis Date    Acid reflux     Anesthesia     slow at waking up postop 01/2022, pt received Narcan x1    Arthritis     Arthrofibrosis of knee joint, right     Asthma     Asthma     Eczema     HTN (hypertension)     Hyperlipidemia     Hypertension     IBS (irritable bowel syndrome)     Migraine       Family History   Problem Relation Age of Onset    Asthma Mother     Asthma Father     Colon cancer Father 60    Brain cancer Father     Lung cancer Father     Breast cancer Sister     Malig Hyperthermia Neg Hx       Past Surgical History:   Procedure Laterality Date    APPENDECTOMY      COLONOSCOPY      COLONOSCOPY N/A 9/15/2022    Procedure: COLONOSCOPY;  Surgeon: Clayton Stroud MD;  Location: [facility] ENDOSCOPY;  Service: Gastroenterology;  Laterality: N/A;  NORMAL COLON    GALLBLADDER SURGERY      KNEE JOINT MANIPULATION Right 02/28/2022    Procedure: RIGHT KNEE MANIPULATION;  Surgeon: Tevin Camacho MD;  Location: [facility] MAIN OR;  Service: Orthopedics;  Laterality: Right;    TOTAL KNEE ARTHROPLASTY Right 01/11/2022    Procedure: TOTAL KNEE ARTHROPLASTY WITH NAVIGATION;  Surgeon: Tevin Camacho MD;  Location: [facility] MAIN OR;  Service: Orthopedics;  Laterality: Right;    TUBAL ABDOMINAL LIGATION          Current Outpatient Medications:     albuterol sulfate  (90 Base)  "MCG/ACT inhaler, Inhale 2 puffs Every 4 (Four) Hours As Needed (Cough)., Disp: 8 g, Rfl: 0    Ascorbic Acid (KALLIE-C PO), Take  by mouth., Disp: , Rfl:     atorvastatin (LIPITOR) 40 MG tablet, TAKE 1 TABLET BY MOUTH EVERYDAY AT BEDTIME, Disp: 90 tablet, Rfl: 0    B Complex Vitamins (VITAMIN B COMPLEX PO), Take  by mouth., Disp: , Rfl:     FLUoxetine (PROzac) 40 MG capsule, TAKE 1 CAPSULE BY MOUTH EVERY DAY, Disp: 90 capsule, Rfl: 1    fluticasone (FLONASE) 50 MCG/ACT nasal spray, SPRAY 2 SPRAYS INTO THE NOSTRIL DAILY AS DIRECTED BY PROVIDER, Disp: 16 mL, Rfl: 1    hydroCHLOROthiazide 25 MG tablet, Take 2 tablets by mouth Daily., Disp: 90 tablet, Rfl: 1    lisinopril (PRINIVIL,ZESTRIL) 40 MG tablet, Take 1 tablet by mouth Daily., Disp: 90 tablet, Rfl: 1    montelukast (SINGULAIR) 10 MG tablet, TAKE 1 TABLET BY MOUTH EVERY DAY AT NIGHT, Disp: 90 tablet, Rfl: 1    omeprazole (priLOSEC) 20 MG capsule, TAKE 20 MG BY MOUTH DAILY., Disp: 90 capsule, Rfl: 1    SUMAtriptan (IMITREX) 100 MG tablet, TAKE 1 TABLET BY MOUTH 1 (ONE) TIME AS NEEDED FOR MIGRAINE., Disp: 9 tablet, Rfl: 1    topiramate (TOPAMAX) 200 MG tablet, TAKE 1 TABLET BY MOUTH TWICE A DAY, Disp: 60 tablet, Rfl: 5    Vitamin D, Cholecalciferol, (CHOLECALCIFEROL) 10 MCG (400 UNIT) tablet, Take 1 tablet by mouth Daily., Disp: 90 tablet, Rfl: 1    triamcinolone (KENALOG) 0.1 % cream, Apply 1 Application topically to the appropriate area as directed 2 (Two) Times a Day. (Patient not taking: Reported on 1/24/2025), Disp: 45 g, Rfl: 0    OBJECTIVE  Vital Signs:   /70 (BP Location: Left arm, Patient Position: Sitting, Cuff Size: Large Adult)   Pulse 79   Ht 174 cm (68.5\")   Wt 107 kg (235 lb)   SpO2 99%   BMI 35.21 kg/m²    Estimated body mass index is 35.21 kg/m² as calculated from the following:    Height as of this encounter: 174 cm (68.5\").    Weight as of this encounter: 107 kg (235 lb).     Wt Readings from Last 3 Encounters:   01/24/25 107 kg (235 lb) "   11/22/24 107 kg (235 lb)   10/08/24 104 kg (230 lb)     BP Readings from Last 3 Encounters:   01/24/25 118/70   11/22/24 156/96   10/08/24 (!) 155/105       Physical Exam  Vitals reviewed.   Constitutional:       General: She is awake. She is not in acute distress.     Appearance: She is well-developed and well-groomed. She is obese. She is not ill-appearing.   HENT:      Head: Normocephalic and atraumatic.      Right Ear: Tympanic membrane, ear canal and external ear normal.      Left Ear: Tympanic membrane, ear canal and external ear normal.      Nose: Nose normal.      Mouth/Throat:      Mouth: Mucous membranes are moist.      Pharynx: Oropharynx is clear. No oropharyngeal exudate or posterior oropharyngeal erythema.   Eyes:      Extraocular Movements: Extraocular movements intact.      Conjunctiva/sclera: Conjunctivae normal.      Pupils: Pupils are equal, round, and reactive to light.   Neck:      Thyroid: No thyroid mass, thyromegaly or thyroid tenderness.   Cardiovascular:      Rate and Rhythm: Normal rate and regular rhythm.      Heart sounds: Normal heart sounds.   Pulmonary:      Effort: Pulmonary effort is normal.      Breath sounds: Normal breath sounds.   Abdominal:      General: Abdomen is flat. Bowel sounds are normal. There is no distension.      Palpations: Abdomen is soft.      Tenderness: There is no abdominal tenderness.   Musculoskeletal:         General: Normal range of motion.      Cervical back: Normal range of motion and neck supple. No rigidity or tenderness.   Lymphadenopathy:      Cervical: No cervical adenopathy.   Skin:     General: Skin is warm and dry.   Neurological:      Mental Status: She is alert and oriented to person, place, and time.   Psychiatric:         Mood and Affect: Mood normal.         Behavior: Behavior normal. Behavior is cooperative.         Thought Content: Thought content normal.         Judgment: Judgment normal.          Result Review    Common labs           3/19/2024    10:41   Common Labs   Glucose 93    BUN 12    Creatinine 0.94    Sodium 141    Potassium 4.2    Chloride 108    Calcium 9.3    Albumin 4.4    Total Bilirubin 0.6    Alkaline Phosphatase 119    AST (SGOT) 17    ALT (SGPT) 20    WBC 4.12    Hemoglobin 11.9    Hematocrit 36.6    Platelets 192    Total Cholesterol 177    Triglycerides 102    HDL Cholesterol 54    LDL Cholesterol  105        XR Knee 4+ View Left    Result Date: 10/8/2024  Impression: 1. No acute osseous abnormality of the left knee. 2. Mild medial compartment joint space narrowing. Electronically Signed: Satnam Hernandez  10/8/2024 7:23 PM EDT  Workstation ID: [id number]    XR Wrist 3+ View Left    Result Date: 10/8/2024  Impression: No acute osseous abnormality of the left wrist, forearm, or elbow Electronically Signed: Corby Bennett  10/8/2024 7:21 PM EDT  Workstation ID: [id number]    XR Forearm 2 View Left    Result Date: 10/8/2024  Impression: No acute osseous abnormality of the left wrist, forearm, or elbow Electronically Signed: Corby Bennett  10/8/2024 7:21 PM EDT  Workstation ID: [id number]    XR Elbow 3+ View Left    Result Date: 10/8/2024  Impression: No acute osseous abnormality of the left wrist, forearm, or elbow Electronically Signed: Corby Bennett  10/8/2024 7:21 PM EDT  Workstation ID: [id number]    CT Chest With Contrast Diagnostic    Result Date: 9/30/2024  Impression: Minimal scarring in the mid and lower lung fields. No acute findings. Electronically Signed: Niles Saeed MD  9/30/2024 12:25 PM EDT  Workstation ID: [id number]        The above data has been reviewed by XOCHILT Thurman 01/24/2025 09:36 EST.          Patient Care Team:  Lynn Waite APRN as PCP - General (Nurse Practitioner)            ASSESSMENT & PLAN    Diagnoses and all orders for this visit:    1. Annual physical exam (Primary)  Comments:  Preventative counseling  Healthy diet  Daily exercise  Get adequate sleep  Orders:  -     Comprehensive  Metabolic Panel; Future  -     CBC & Differential; Future  -     Lipid Panel; Future  -     TSH+Free T4; Future    2. Mixed hyperlipidemia  Comments:  Repeat lipid panel, continue Lipitor 40 mg  Orders:  -     Comprehensive Metabolic Panel; Future  -     Lipid Panel; Future  -     CT Cardiac Calcium Score Without Dye; Future    3. Primary hypertension  Comments:  Stable on lisinopril 40 mg and HCTZ 50 mg, continue  Orders:  -     Comprehensive Metabolic Panel; Future  -     Lipid Panel; Future  -     CT Cardiac Calcium Score Without Dye; Future    4. Need for influenza vaccination  -     Fluzone >6mos ([id number])    5. Need for shingles vaccine  -     Shingrix Vaccine    6. Mild intermittent asthma without complication  Comments:  stable on singulair and PRN albuterol    7. Screening for thyroid disorder  -     TSH+Free T4; Future    8. Family history of cardiovascular disease  Comments:  CT calciums coring ordered, if positive for CVD will prescribe wegovy  Orders:  -     CT Cardiac Calcium Score Without Dye; Future         Tobacco Use: Low Risk  (1/24/2025)    Patient History     Smoking Tobacco Use: Never     Smokeless Tobacco Use: Never     Passive Exposure: Never       Follow Up     Return in about 6 months (around 7/24/2025) for Next scheduled follow up.      Patient was given instructions and counseling regarding her condition or for health maintenance advice. Please see specific information pulled into the AVS if appropriate.   I have reviewed information obtained and documented by others and I have confirmed the accuracy of this documented note.    XOCHILT Thurman

## 2025-02-01 DIAGNOSIS — F41.9 ANXIETY: ICD-10-CM

## 2025-02-01 DIAGNOSIS — K21.9 GASTROESOPHAGEAL REFLUX DISEASE WITHOUT ESOPHAGITIS: ICD-10-CM

## 2025-02-03 NOTE — TELEPHONE ENCOUNTER
FLUOXETINE  LRF 8/8/2024  LOV 1/24/2025  F/U 7/24/2025    OMEPRAZOLE  LRF 8/8/2024  LOV 1/24/2025  F/U 7/24/2025

## 2025-02-04 RX ORDER — FLUOXETINE 40 MG/1
40 CAPSULE ORAL DAILY
Qty: 90 CAPSULE | Refills: 1 | Status: SHIPPED | OUTPATIENT
Start: 2025-02-04

## 2025-02-23 DIAGNOSIS — I10 PRIMARY HYPERTENSION: ICD-10-CM

## 2025-02-25 RX ORDER — HYDROCHLOROTHIAZIDE 25 MG/1
50 TABLET ORAL DAILY
Qty: 90 TABLET | Refills: 1 | Status: SHIPPED | OUTPATIENT
Start: 2025-02-25

## 2025-03-14 ENCOUNTER — CLINICAL SUPPORT (OUTPATIENT)
Dept: FAMILY MEDICINE CLINIC | Facility: CLINIC | Age: 55
End: 2025-03-14
Payer: COMMERCIAL

## 2025-03-14 ENCOUNTER — LAB (OUTPATIENT)
Dept: LAB | Facility: HOSPITAL | Age: 55
End: 2025-03-14
Payer: COMMERCIAL

## 2025-03-14 DIAGNOSIS — I10 PRIMARY HYPERTENSION: ICD-10-CM

## 2025-03-14 DIAGNOSIS — E78.2 MIXED HYPERLIPIDEMIA: ICD-10-CM

## 2025-03-14 DIAGNOSIS — Z23 NEED FOR SHINGLES VACCINE: Primary | ICD-10-CM

## 2025-03-14 DIAGNOSIS — Z13.29 SCREENING FOR THYROID DISORDER: ICD-10-CM

## 2025-03-14 DIAGNOSIS — Z00.00 ANNUAL PHYSICAL EXAM: ICD-10-CM

## 2025-03-14 LAB
ALBUMIN SERPL-MCNC: 4.1 G/DL (ref 3.5–5.2)
ALBUMIN/GLOB SERPL: 1.2 G/DL
ALP SERPL-CCNC: 136 U/L (ref 39–117)
ALT SERPL W P-5'-P-CCNC: 28 U/L (ref 1–33)
ANION GAP SERPL CALCULATED.3IONS-SCNC: 16.5 MMOL/L (ref 5–15)
AST SERPL-CCNC: 25 U/L (ref 1–32)
BASOPHILS # BLD AUTO: 0.04 10*3/MM3 (ref 0–0.2)
BASOPHILS NFR BLD AUTO: 1.1 % (ref 0–1.5)
BILIRUB SERPL-MCNC: 0.9 MG/DL (ref 0–1.2)
BUN SERPL-MCNC: 12 MG/DL (ref 6–20)
BUN/CREAT SERPL: 11.5 (ref 7–25)
CALCIUM SPEC-SCNC: 9.9 MG/DL (ref 8.6–10.5)
CHLORIDE SERPL-SCNC: 95 MMOL/L (ref 98–107)
CHOLEST SERPL-MCNC: 177 MG/DL (ref 0–200)
CO2 SERPL-SCNC: 25.5 MMOL/L (ref 22–29)
CREAT SERPL-MCNC: 1.04 MG/DL (ref 0.57–1)
DEPRECATED RDW RBC AUTO: 42.6 FL (ref 37–54)
EGFRCR SERPLBLD CKD-EPI 2021: 63.6 ML/MIN/1.73
EOSINOPHIL # BLD AUTO: 0.22 10*3/MM3 (ref 0–0.4)
EOSINOPHIL NFR BLD AUTO: 5.9 % (ref 0.3–6.2)
ERYTHROCYTE [DISTWIDTH] IN BLOOD BY AUTOMATED COUNT: 13.7 % (ref 12.3–15.4)
GLOBULIN UR ELPH-MCNC: 3.4 GM/DL
GLUCOSE SERPL-MCNC: 115 MG/DL (ref 65–99)
HCT VFR BLD AUTO: 34.3 % (ref 34–46.6)
HDLC SERPL-MCNC: 57 MG/DL (ref 40–60)
HGB BLD-MCNC: 11.4 G/DL (ref 12–15.9)
IMM GRANULOCYTES # BLD AUTO: 0.01 10*3/MM3 (ref 0–0.05)
IMM GRANULOCYTES NFR BLD AUTO: 0.3 % (ref 0–0.5)
LDLC SERPL CALC-MCNC: 100 MG/DL (ref 0–100)
LDLC/HDLC SERPL: 1.72 {RATIO}
LYMPHOCYTES # BLD AUTO: 0.92 10*3/MM3 (ref 0.7–3.1)
LYMPHOCYTES NFR BLD AUTO: 24.9 % (ref 19.6–45.3)
MCH RBC QN AUTO: 28 PG (ref 26.6–33)
MCHC RBC AUTO-ENTMCNC: 33.2 G/DL (ref 31.5–35.7)
MCV RBC AUTO: 84.3 FL (ref 79–97)
MONOCYTES # BLD AUTO: 0.37 10*3/MM3 (ref 0.1–0.9)
MONOCYTES NFR BLD AUTO: 10 % (ref 5–12)
NEUTROPHILS NFR BLD AUTO: 2.14 10*3/MM3 (ref 1.7–7)
NEUTROPHILS NFR BLD AUTO: 57.8 % (ref 42.7–76)
NRBC BLD AUTO-RTO: 0 /100 WBC (ref 0–0.2)
PLATELET # BLD AUTO: 221 10*3/MM3 (ref 140–450)
PMV BLD AUTO: 9.9 FL (ref 6–12)
POTASSIUM SERPL-SCNC: 3 MMOL/L (ref 3.5–5.2)
PROT SERPL-MCNC: 7.5 G/DL (ref 6–8.5)
RBC # BLD AUTO: 4.07 10*6/MM3 (ref 3.77–5.28)
SODIUM SERPL-SCNC: 137 MMOL/L (ref 136–145)
T4 FREE SERPL-MCNC: 1.09 NG/DL (ref 0.92–1.68)
TRIGL SERPL-MCNC: 109 MG/DL (ref 0–150)
TSH SERPL DL<=0.05 MIU/L-ACNC: 1.24 UIU/ML (ref 0.27–4.2)
VLDLC SERPL-MCNC: 20 MG/DL (ref 5–40)
WBC NRBC COR # BLD AUTO: 3.7 10*3/MM3 (ref 3.4–10.8)

## 2025-03-14 PROCEDURE — 80053 COMPREHEN METABOLIC PANEL: CPT

## 2025-03-14 PROCEDURE — 84439 ASSAY OF FREE THYROXINE: CPT

## 2025-03-14 PROCEDURE — 80061 LIPID PANEL: CPT

## 2025-03-14 PROCEDURE — 84443 ASSAY THYROID STIM HORMONE: CPT

## 2025-03-14 PROCEDURE — 85025 COMPLETE CBC W/AUTO DIFF WBC: CPT

## 2025-03-14 PROCEDURE — 36415 COLL VENOUS BLD VENIPUNCTURE: CPT

## 2025-03-20 DIAGNOSIS — J45.20 MILD INTERMITTENT ASTHMA WITHOUT COMPLICATION: ICD-10-CM

## 2025-03-20 DIAGNOSIS — I10 PRIMARY HYPERTENSION: ICD-10-CM

## 2025-03-21 ENCOUNTER — HOSPITAL ENCOUNTER (OUTPATIENT)
Dept: CT IMAGING | Facility: HOSPITAL | Age: 55
Discharge: HOME | End: 2025-03-21

## 2025-03-21 DIAGNOSIS — I10 PRIMARY HYPERTENSION: ICD-10-CM

## 2025-03-21 DIAGNOSIS — Z82.49 FAMILY HISTORY OF CARDIOVASCULAR DISEASE: ICD-10-CM

## 2025-03-21 DIAGNOSIS — E78.2 MIXED HYPERLIPIDEMIA: ICD-10-CM

## 2025-03-21 PROCEDURE — 75571 CT HRT W/O DYE W/CA TEST: CPT

## 2025-03-21 RX ORDER — LISINOPRIL 40 MG/1
40 TABLET ORAL DAILY
Qty: 90 TABLET | Refills: 1 | Status: SHIPPED | OUTPATIENT
Start: 2025-03-21

## 2025-03-21 RX ORDER — ALBUTEROL SULFATE 90 UG/1
2 INHALANT RESPIRATORY (INHALATION) EVERY 4 HOURS PRN
Qty: 8 G | Refills: 0 | Status: SHIPPED | OUTPATIENT
Start: 2025-03-21

## 2025-04-08 DIAGNOSIS — E78.2 MIXED HYPERLIPIDEMIA: ICD-10-CM

## 2025-04-08 RX ORDER — ATORVASTATIN CALCIUM 40 MG/1
40 TABLET, FILM COATED ORAL
Qty: 90 TABLET | Refills: 0 | Status: SHIPPED | OUTPATIENT
Start: 2025-04-08

## 2025-06-30 DIAGNOSIS — I10 PRIMARY HYPERTENSION: ICD-10-CM

## 2025-07-01 RX ORDER — HYDROCHLOROTHIAZIDE 25 MG/1
50 TABLET ORAL DAILY
Qty: 90 TABLET | Refills: 1 | OUTPATIENT
Start: 2025-07-01

## 2025-07-06 DIAGNOSIS — E78.2 MIXED HYPERLIPIDEMIA: ICD-10-CM

## 2025-07-08 ENCOUNTER — LAB (OUTPATIENT)
Dept: LAB | Facility: HOSPITAL | Age: 55
End: 2025-07-08
Payer: COMMERCIAL

## 2025-07-08 DIAGNOSIS — E87.6 HYPOKALEMIA: ICD-10-CM

## 2025-07-08 DIAGNOSIS — R73.09 ELEVATED GLUCOSE LEVEL: ICD-10-CM

## 2025-07-08 LAB
ANION GAP SERPL CALCULATED.3IONS-SCNC: 10.2 MMOL/L (ref 5–15)
BUN SERPL-MCNC: 12 MG/DL (ref 6–20)
BUN/CREAT SERPL: 12.1 (ref 7–25)
CALCIUM SPEC-SCNC: 9.5 MG/DL (ref 8.6–10.5)
CHLORIDE SERPL-SCNC: 100 MMOL/L (ref 98–107)
CO2 SERPL-SCNC: 25.8 MMOL/L (ref 22–29)
CREAT SERPL-MCNC: 0.99 MG/DL (ref 0.57–1)
EGFRCR SERPLBLD CKD-EPI 2021: 67.1 ML/MIN/1.73
GLUCOSE SERPL-MCNC: 108 MG/DL (ref 65–99)
HBA1C MFR BLD: 5.5 % (ref 4.8–5.6)
POTASSIUM SERPL-SCNC: 3.8 MMOL/L (ref 3.5–5.2)
SODIUM SERPL-SCNC: 136 MMOL/L (ref 136–145)

## 2025-07-08 PROCEDURE — 83036 HEMOGLOBIN GLYCOSYLATED A1C: CPT

## 2025-07-08 PROCEDURE — 80048 BASIC METABOLIC PNL TOTAL CA: CPT

## 2025-07-08 PROCEDURE — 36415 COLL VENOUS BLD VENIPUNCTURE: CPT

## 2025-07-08 RX ORDER — ATORVASTATIN CALCIUM 40 MG/1
40 TABLET, FILM COATED ORAL
Qty: 90 TABLET | Refills: 0 | Status: SHIPPED | OUTPATIENT
Start: 2025-07-08

## 2025-07-24 ENCOUNTER — OFFICE VISIT (OUTPATIENT)
Dept: FAMILY MEDICINE CLINIC | Facility: CLINIC | Age: 55
End: 2025-07-24
Payer: COMMERCIAL

## 2025-07-24 VITALS
BODY MASS INDEX: 34.48 KG/M2 | OXYGEN SATURATION: 99 % | HEIGHT: 69 IN | HEART RATE: 87 BPM | WEIGHT: 232.8 LBS | SYSTOLIC BLOOD PRESSURE: 114 MMHG | DIASTOLIC BLOOD PRESSURE: 78 MMHG

## 2025-07-24 DIAGNOSIS — J45.20 MILD INTERMITTENT ASTHMA WITHOUT COMPLICATION: ICD-10-CM

## 2025-07-24 DIAGNOSIS — G43.109 MIGRAINE WITH AURA AND WITHOUT STATUS MIGRAINOSUS, NOT INTRACTABLE: ICD-10-CM

## 2025-07-24 DIAGNOSIS — F32.A DEPRESSION, UNSPECIFIED DEPRESSION TYPE: ICD-10-CM

## 2025-07-24 DIAGNOSIS — E66.09 CLASS 1 OBESITY DUE TO EXCESS CALORIES WITH SERIOUS COMORBIDITY AND BODY MASS INDEX (BMI) OF 34.0 TO 34.9 IN ADULT: ICD-10-CM

## 2025-07-24 DIAGNOSIS — K21.9 GASTROESOPHAGEAL REFLUX DISEASE WITHOUT ESOPHAGITIS: ICD-10-CM

## 2025-07-24 DIAGNOSIS — F41.9 ANXIETY: ICD-10-CM

## 2025-07-24 DIAGNOSIS — E78.2 MIXED HYPERLIPIDEMIA: Primary | ICD-10-CM

## 2025-07-24 DIAGNOSIS — E55.9 VITAMIN D DEFICIENCY: ICD-10-CM

## 2025-07-24 DIAGNOSIS — I10 PRIMARY HYPERTENSION: ICD-10-CM

## 2025-07-24 DIAGNOSIS — E66.811 CLASS 1 OBESITY DUE TO EXCESS CALORIES WITH SERIOUS COMORBIDITY AND BODY MASS INDEX (BMI) OF 34.0 TO 34.9 IN ADULT: ICD-10-CM

## 2025-07-24 PROCEDURE — 99214 OFFICE O/P EST MOD 30 MIN: CPT

## 2025-07-24 PROCEDURE — 3074F SYST BP LT 130 MM HG: CPT

## 2025-07-24 PROCEDURE — 1126F AMNT PAIN NOTED NONE PRSNT: CPT

## 2025-07-24 PROCEDURE — 3078F DIAST BP <80 MM HG: CPT

## 2025-07-24 PROCEDURE — 1160F RVW MEDS BY RX/DR IN RCRD: CPT

## 2025-07-24 PROCEDURE — 1159F MED LIST DOCD IN RCRD: CPT

## 2025-07-24 RX ORDER — ALBUTEROL SULFATE 90 UG/1
2 INHALANT RESPIRATORY (INHALATION) EVERY 4 HOURS PRN
Qty: 8 G | Refills: 1 | Status: SHIPPED | OUTPATIENT
Start: 2025-07-24

## 2025-07-24 RX ORDER — BUPROPION HYDROCHLORIDE 150 MG/1
150 TABLET ORAL DAILY
Qty: 90 TABLET | Refills: 0 | Status: SHIPPED | OUTPATIENT
Start: 2025-07-24

## 2025-07-24 RX ORDER — HYDROCHLOROTHIAZIDE 25 MG/1
50 TABLET ORAL DAILY
Qty: 180 TABLET | Refills: 1 | Status: SHIPPED | OUTPATIENT
Start: 2025-07-24

## 2025-07-31 DIAGNOSIS — J45.20 MILD INTERMITTENT ASTHMA WITHOUT COMPLICATION: ICD-10-CM

## 2025-07-31 DIAGNOSIS — J30.2 SEASONAL ALLERGIC RHINITIS, UNSPECIFIED TRIGGER: ICD-10-CM

## 2025-07-31 RX ORDER — MONTELUKAST SODIUM 10 MG/1
10 TABLET ORAL
Qty: 90 TABLET | Refills: 1 | Status: SHIPPED | OUTPATIENT
Start: 2025-07-31

## 2025-08-11 DIAGNOSIS — F41.9 ANXIETY: ICD-10-CM

## 2025-08-11 RX ORDER — FLUOXETINE HYDROCHLORIDE 40 MG/1
40 CAPSULE ORAL DAILY
Qty: 90 CAPSULE | Refills: 1 | Status: SHIPPED | OUTPATIENT
Start: 2025-08-11

## 2025-08-22 ENCOUNTER — LAB (OUTPATIENT)
Dept: LAB | Facility: HOSPITAL | Age: 55
End: 2025-08-22
Payer: COMMERCIAL